# Patient Record
Sex: MALE | Race: WHITE | Employment: FULL TIME | URBAN - METROPOLITAN AREA
[De-identification: names, ages, dates, MRNs, and addresses within clinical notes are randomized per-mention and may not be internally consistent; named-entity substitution may affect disease eponyms.]

---

## 2022-03-01 ENCOUNTER — OFFICE VISIT (OUTPATIENT)
Dept: PRIMARY CARE CLINIC | Age: 27
End: 2022-03-01
Payer: COMMERCIAL

## 2022-03-01 ENCOUNTER — TELEPHONE (OUTPATIENT)
Dept: PRIMARY CARE CLINIC | Age: 27
End: 2022-03-01

## 2022-03-01 VITALS
HEIGHT: 71 IN | OXYGEN SATURATION: 97 % | BODY MASS INDEX: 44.1 KG/M2 | HEART RATE: 99 BPM | RESPIRATION RATE: 18 BRPM | TEMPERATURE: 97.7 F | DIASTOLIC BLOOD PRESSURE: 91 MMHG | SYSTOLIC BLOOD PRESSURE: 139 MMHG | WEIGHT: 315 LBS

## 2022-03-01 DIAGNOSIS — Z11.59 ENCOUNTER FOR HEPATITIS C SCREENING TEST FOR LOW RISK PATIENT: ICD-10-CM

## 2022-03-01 DIAGNOSIS — Z23 ENCOUNTER FOR IMMUNIZATION: ICD-10-CM

## 2022-03-01 DIAGNOSIS — E29.1 HYPOGONADISM IN MALE: Primary | ICD-10-CM

## 2022-03-01 DIAGNOSIS — R03.0 WHITE COAT SYNDROME WITH HIGH BLOOD PRESSURE WITHOUT HYPERTENSION: ICD-10-CM

## 2022-03-01 DIAGNOSIS — Z79.899 MEDICATION MANAGEMENT: ICD-10-CM

## 2022-03-01 DIAGNOSIS — Z76.89 ENCOUNTER TO ESTABLISH CARE: ICD-10-CM

## 2022-03-01 DIAGNOSIS — G43.709 CHRONIC MIGRAINE WITHOUT AURA WITHOUT STATUS MIGRAINOSUS, NOT INTRACTABLE: ICD-10-CM

## 2022-03-01 DIAGNOSIS — R73.03 PREDIABETES: ICD-10-CM

## 2022-03-01 DIAGNOSIS — Z00.00 ANNUAL PHYSICAL EXAM: ICD-10-CM

## 2022-03-01 DIAGNOSIS — L65.9 ALOPECIA: ICD-10-CM

## 2022-03-01 DIAGNOSIS — E66.01 MORBID OBESITY WITH BMI OF 60.0-69.9, ADULT (HCC): ICD-10-CM

## 2022-03-01 PROCEDURE — 99204 OFFICE O/P NEW MOD 45 MIN: CPT | Performed by: NURSE PRACTITIONER

## 2022-03-01 PROCEDURE — 90471 IMMUNIZATION ADMIN: CPT | Performed by: NURSE PRACTITIONER

## 2022-03-01 PROCEDURE — 90686 IIV4 VACC NO PRSV 0.5 ML IM: CPT | Performed by: NURSE PRACTITIONER

## 2022-03-01 RX ORDER — RIMEGEPANT SULFATE 75 MG/75MG
TABLET, ORALLY DISINTEGRATING ORAL
COMMUNITY
Start: 2022-02-15

## 2022-03-01 RX ORDER — TESTOSTERONE CYPIONATE 200 MG/ML
1 INJECTION INTRAMUSCULAR
COMMUNITY
End: 2022-03-01 | Stop reason: SDUPTHER

## 2022-03-01 RX ORDER — METFORMIN HYDROCHLORIDE 1000 MG/1
1000 TABLET, FILM COATED, EXTENDED RELEASE ORAL DAILY
COMMUNITY
End: 2022-03-01 | Stop reason: SDUPTHER

## 2022-03-01 RX ORDER — PROPRANOLOL HYDROCHLORIDE 10 MG/1
10 TABLET ORAL 2 TIMES DAILY
Qty: 20 TABLET | Refills: 1 | Status: SHIPPED | OUTPATIENT
Start: 2022-03-01 | End: 2022-07-18 | Stop reason: ALTCHOICE

## 2022-03-01 RX ORDER — METFORMIN HYDROCHLORIDE 1000 MG/1
1000 TABLET, FILM COATED, EXTENDED RELEASE ORAL DAILY
Qty: 30 TABLET | Refills: 1 | Status: SHIPPED | OUTPATIENT
Start: 2022-03-01 | End: 2022-03-04 | Stop reason: ALTCHOICE

## 2022-03-01 RX ORDER — FINASTERIDE 1 MG/1
1 TABLET, FILM COATED ORAL DAILY
Qty: 30 TABLET | Refills: 0 | Status: SHIPPED | OUTPATIENT
Start: 2022-03-01 | End: 2022-03-28 | Stop reason: SDUPTHER

## 2022-03-01 RX ORDER — FINASTERIDE 1 MG/1
5 TABLET, FILM COATED ORAL DAILY
COMMUNITY
End: 2022-03-01 | Stop reason: SDUPTHER

## 2022-03-01 RX ORDER — ANASTROZOLE 1 MG/1
0.5 TABLET ORAL
COMMUNITY
Start: 2022-02-16 | End: 2022-03-01 | Stop reason: SDUPTHER

## 2022-03-01 RX ORDER — TESTOSTERONE CYPIONATE 200 MG/ML
200 INJECTION INTRAMUSCULAR
Qty: 4 ML | Refills: 0 | Status: SHIPPED | OUTPATIENT
Start: 2022-03-04 | End: 2022-03-28 | Stop reason: SDUPTHER

## 2022-03-01 RX ORDER — ANASTROZOLE 1 MG/1
0.5 TABLET ORAL
Qty: 16 TABLET | Refills: 0 | Status: SHIPPED | OUTPATIENT
Start: 2022-03-01 | End: 2022-03-28 | Stop reason: SDUPTHER

## 2022-03-01 NOTE — PROGRESS NOTES
Chief Complaint   Patient presents with   [address]    Weight Management       Health Maintenance Due   Topic    Hepatitis C Screening     Depression Screen     DTaP/Tdap/Td series (1 - Tdap)    Flu Vaccine (1)    COVID-19 Vaccine (3 - Booster for [employer] series)        1. Have you been to the ER, urgent care clinic since your last visit? Hospitalized since your last visit? No    2. Have you seen or consulted any other health care providers outside of the [address] [place] [address] since your last visit? Include any pap smears or colon screening.  No    Visit Vitals  BP (!) 167/103 (BP 1 Location: Left lower arm, BP Patient Position: Sitting, BP Cuff Size: Adult long)   Pulse 99   Temp 97.7 °F (36.5 °C) (Temporal)   Resp 18   Ht 5' 11\" (1.803 m)   SpO2 97%

## 2022-03-01 NOTE — LETTER
3/1/2022 11:18 AM    Mr. Tommy Washington  [address] [id number]    CONTROLLED SUBSTANCE MEDICATION AGREEMENT     Patient Name: Tommy Washington  Patient YOB: 1995     I understand, that controlled substance medications may be used to help better manage my symptoms and to improve my ability to function at home, work and in social settings. However, I also understand that these medications do have risks, which have been discussed with me, including possible development of physical or psychological dependence. I understand that successful treatment requires mutual trust and honesty between me and my provider. I understand and agree that following this Medication Agreement is necessary in continuing my provider-patient relationship and the success of my treatment plan. Explanation from my Provider: Benefits and Goals of Controlled Substance Medications: There are two potential goals for your treatment: (1) decreased pain and suffering (2) improved daily life functions. There are many possible treatments for your chronic condition(s). Alternatives such as physical therapy, yoga, massage, home daily exercise, meditation, relaxation techniques, injections, chiropractic manipulations, surgery, cognitive therapy, hypnosis and many medications that are not habit-forming may be used. Use of controlled substance medications may be helpful, but they are unlikely to resolve all symptoms or restore all function. Explanation from my Provider: Risks of Controlled Substance Medications:  Opioid pain medications: These medications can lead to problems such as addiction/dependence, sedation, lightheadedness/dizziness, memory issues, falls, constipation, nausea, or vomiting. They may also impair the ability to drive or operate machinery. Additionally, these medications may lower testosterone levels, leading to loss of bone strength, stamina and sex drive.   They may cause problems with breathing, sleep apnea and reduced coughing, which is especially dangerous for patients with lung disease. Overdose or dangerous interactions with alcohol and other medications may occur, leading to death. Hyperalgesia may develop, which means patients receiving opioids for the treatment of pain may become more sensitive to certain painful stimuli, and in some cases, experience pain from ordinarily non-painful stimuli. Women between the ages of 14-53 who could become pregnant should carefully weigh the risks and benefits of opioids with their physicians, as these medications increase the risk of pregnancy complications, including miscarriage,  delivery and stillbirth. It is also possible for babies to be born addicted to opioids. Opioid dependence withdrawal symptoms may include; feelings of uneasiness, increased pain, irritability, belly pain, diarrhea, sweats and goose-flesh. Benzodiazepines and non-benzodiazepine sleep medications: These medications can lead to problems such as addiction/dependence, sedation, fatigue, lightheadedness, dizziness, incoordination, falls, depression, hallucinations, and impaired judgment, memory and concentration. The ability to drive and operate machinery may also be affected. Abnormal sleep-related behaviors have been reported, including sleepwalking, driving, making telephone calls, eating, or having sex while not fully awake. These medications can suppress breathing and worsen sleep apnea, particularly when combined with alcohol or other sedating medications, potentially leading to death. Dependence withdrawal symptoms may include tremors, anxiety, hallucinations and seizures. Initials:_______  Stimulants:  Common adverse effects include addiction/dependence, increased blood  pressure and heart rate, decreased appetite, nausea, involuntary weight loss, insomnia,  irritability, and headaches.   These risks may increase when these medications are combined with other stimulants, such as caffeine pills or energy drinks, certain weight loss supplements and oral decongestants. Dependence withdrawal symptoms may include depressed mood, loss of interest, suicidal thoughts, anxiety, fatigue, appetite changes and agitation. Testosterone replacement therapy:  Potential side effects include increased risk of stroke and heart attack, blood clots, increased blood pressure, increased cholesterol, enlarged prostate, sleep apnea, irritability/aggression and other mood disorders, and decreased fertility. I agree and understand that I and my prescriber have the following rights and responsibilities regarding my treatment plan:     1. MY RIGHTS:  To be informed of my treatment and medication plan. To be an active participant in my health and wellbeing. 2. MY RESPONSIBILITY AND UNDERSTANDING FOR USE OF MEDICATIONS   I will take medications at the dose and frequency as directed. For my safety, I will not increase or change how I take my medications without the recommendation of my healthcare provider.  I will actively participate in any program recommended by my provider which may improve function, including social, physical, psychological programs.  I will not take my medications with alcohol or other drugs not prescribed to me. I understand that drinking alcohol with my medications increases the chances of side effects, including reduced breathing rate and could lead to personal injury when operating machinery.  I understand that if I have a history of substance use disorders, including alcohol or other illicit drugs, that I may be at increased risk of addiction to my medications.  I agree to notify my provider immediately if I should become pregnant so that my treatment plan can be adjusted.    I agree and understand that I shall only receive controlled substance medications from the prescriber that signed this agreement unless there is written agreement among other prescribers of controlled substances outlining the responsibility of the medications being prescribed.  I understand that the if the controlled medication is not helping to achieve goals, the dosage may be tapered and no longer prescribed. 3. MY RESPONSIBILITY FOR COMMUNICATION / PRESCRIPTION RENEWALS   I agree that all controlled substance medications that I take will be prescribed only by my provider. If another healthcare provider prescribes me medication in an emergency, I will notify my provider within seventy-two (72) hours.  I will arrange for refills at the prescribed interval ONLY during regular office hours. I will not ask for refills earlier than agreed, after-hours, on holidays or weekends. Refills may take up to 72 hours for processing and prescriptions to reach the pharmacy.  I will inform my other health care providers that I am taking these medications and of the existence of this [id number]. In the event of an emergency, I will provide the same information to the emergency department prescribers. Initials:_______  [employer] I will keep my provider updated on the pharmacy I am using for controlled medication prescription filling. 4. MY RESPONSIBILITY FOR PROTECTING MEDICATIONS   I will protect my prescriptions and medications. I understand that lost or misplaced prescriptions will not be replaced.  I will keep medications only for my own use and will not share them with others. I will keep all medications away from children.  I agree that if my medications are adjusted or discontinued, I will properly dispose of any remaining medications. I understand that I will be required to dispose of any remaining controlled medications as, directed by my prescriber, prior to being provided with any prescriptions for other controlled medications.   Medication drop box locations can be found at: [email or web address]    5. MY RESPONSIBILITY WITH ILLEGAL DRUGS    I will not use illegal or street drugs or another person's prescription medications not prescribed to me.  If there are identified addiction type symptoms, then referral to a program may be provided by my provider and I agree to follow through with this recommendation. 6. MY RESPONSIBILITY FOR COOPERATION WITH INVESTIGATIONS   I understand that my provider will comply with any applicable law and may discuss my use and/or possible misuse/abuse of controlled substances and alcohol, as appropriate, with any health care provider involved in my care, pharmacist, or legal authority.  I authorize my provider and pharmacy to cooperate fully with law enforcement agencies (as permitted by law) in the investigation of any possible misuse, sale, or other diversion of my controlled substances.  I agree to waive any applicable privilege or right of privacy or confidentiality with respect to these authorizations. 7. PROVIDERS RIGHT TO MONITOR FOR SAFETY: PRESCRIPTION MONITORING / DRUG TESTING   I consent to drug/toxicology screening and will submit to a drug screen upon my providers request to assure I am only taking the prescribed drugs for my safety monitoring. I understand that a drug screen is a laboratory test in which a sample of my urine, blood or saliva is checked to see what drugs I have been taking. This may entail an observed urine specimen, which means that a nurse or other health care provider may watch me provide urine, and I will cooperate if I am asked to provide an observed specimen.  I understand that my provider will check a copy of my State Prescription Monitoring Program () Report in order to safely prescribe medications.  Pill Counts: I consent to pill counts when requested.   I may be asked to bring all my prescribed controlled substance medications, in their original bottles, to all of my scheduled appointments. In addition, my provider may ask me to come to the practice at any time for a random pill count. Initials:_______    8. TERMINATION OF THIS AGREEMENT  For my safety, my prescriber has the right to stop prescribing controlled substance medications and may end this agreement.  Conditions that may result in termination of this agreement:  a. I do not show any improvement in pain, or my activity has not improved. b. I develop rapid tolerance or loss of improvement, as described in my treatment plan.  c. I develop significant side effects from the medication. d. My behavior is not consistent with the responsibilities outlined above, thereby causing safety concerns to continue prescribing controlled substance medications. e. I fail to follow the terms of this agreement. f. Other:____________________________       UNDERSTANDING THIS MEDICATION AGREEMENT:    I have read the above and have had all my questions answered. For chronic disease management, I know that my symptoms can be managed with many types of treatments. A chronic medication trial may be part of my treatment, but I must be an active participant in my care. Medication therapy is only one part of my symptom management plan. In some cases, there may be limited scientific evidence to support the chronic use of certain medications to improve symptoms and daily function. Furthermore, in certain circumstances, there may be scientific information that suggests that the use of chronic controlled substances may worsen my symptoms and increase my risk of unintentional death directly related to this medication therapy. I know that if my provider feels my risk from controlled medications is greater than my benefit, I will have my controlled substance medication(s) compassionately lowered or removed altogether.      I further agree to allow this office to contact my HIPAA contact if there are concerns about my safety and use of the controlled medications. I have agreed to use the prescribed controlled substance medications to me as instructed by my provider and as stated in this Medication Agreement. My initial on each page and my signature below shows that I have read each page and I have had the opportunity to ask questions with answers provided by my provider.     Patient Name (Printed): _____________________________________  Patient Signature:  ______________________   Date: _____________    Prescriber Name (Printed): ___________________________________  Prescriber Signature: _____________________  Date: _____________               Shakeel Spring NP

## 2022-03-01 NOTE — PROGRESS NOTES
[facility]   [address], [address] [facility]  P: [phone]  F: [phone]    SUBJECTIVE     HPI:     Kishor Benjamin is a 33 y.o. male who is seen in the clinic for   Chief Complaint   Patient presents with   [facility]      Patient moved from [place] [address] to [place] in March, 2021. He has struggled to make an appointment with a PCP and has had to \"cut dose in half\" of all prescribed medications. Dr. Ballard Failing in [place] [address]. Requested that patient contact clinic to get medications sent via fax. During the appointment, the patient inquired about potential weight management options that he could pursue. Previously, he stated that providers encouraged Bariatric Surgery. However, the patient has performed his own research and would only like to get surgery if it's his \"last resort\". He has tried Wellbutrin to curb his appetite in the past and it had minimal affect. The patient states that his weight has been [employer]" since the dx of Hypogonadism. However, he still struggles with his appetite and exercise. Inquiring about GLP-1 medications at this time. PDMP has been reviewed. He does not have a record of Testosterone being prescribed. However, he did hand me a Ziploc bag that had a prescription from Dr. Yecenia Saenz. Additionally, the patient has not performed a UDS recently. Advised patient that a 30-day refill can be ordered at this time. However, a UDS along with other lab testing must be performed before getting next refill. Controlled Substance Agreement has been signed by patient and placed in Media. Diet: Poor  Exercise: Limited  Sleep Hygiene:7-8 hours per night. Self-testicular exam: Not performing. Advised to perform once monthly. Immunizations: Up to date other than Flu vaccine. There are no problems to display for this patient.        Past Medical History:   Diagnosis Date    Headache     Hypogonadism in male     Sleep apnea      Past Surgical History:   Procedure Laterality Date    HX TYMPANOSTOMY       Social History     Socioeconomic History    Marital status:      Spouse name: Not on file    Number of children: Not on file    Years of education: Not on file    Highest education level: Not on file   Occupational History    Not on file   Tobacco Use    Smoking status: Never Smoker    Smokeless tobacco: Never Used   Vaping Use    Vaping Use: Never used   Substance and Sexual Activity    Alcohol use: Not Currently     Comment: occ    Drug use: Never    Sexual activity: Yes     Partners: Female   Other Topics Concern    Not on file   Social History Narrative    Not on file     Social Determinants of Health     Financial Resource Strain:     Difficulty of Paying Living Expenses: Not on file   Food Insecurity:     Worried About Running Out of Food in the Last Year: Not on file    [place] of Food in the Last Year: Not on file   Transportation Needs:     Lack of Transportation (Medical): Not on file    Lack of Transportation (Non-Medical):  Not on file   Physical Activity:     Days of Exercise per Week: Not on file    Minutes of Exercise per Session: Not on file   Stress:     Feeling of Stress : Not on file   Social Connections:     Frequency of Communication with Friends and Family: Not on file    Frequency of Social Gatherings with Friends and Family: Not on file    Attends Zoroastrianism Services: Not on file    Active Member of [address] [place] [address] [employer] or Organizations: Not on file    Attends Club or Organization Meetings: Not on file    Marital Status: Not on file   Intimate Partner Violence:     Fear of Current or Ex-Partner: Not on file    Emotionally Abused: Not on file    Physically Abused: Not on file    Sexually Abused: Not on file   Housing Stability:     Unable to Pay for Housing in the Last Year: Not on file    Number of [place] in the Last Year: Not on file    Unstable Housing in the Last Year: Not on file     Family History   Problem Relation Age of Onset    Immune Disorder Mother     COPD Mother     Alcohol abuse Father     Drug Abuse Brother      Immunization History   Administered Date(s) Administered    COVID-19, Pfizer Purple top, DILUTE for use, 12+ yrs, 30mcg/0.3mL dose 01/18/2021, 03/31/2021, 04/21/2021, 01/18/2022    Influenza Vaccine (Quad) PF (>6 Mo Flulaval, Fluarix, and >3 Yrs Afluria, Fluzone 40196) 03/01/2022      No Known Allergies    No results found for any previous visit. No image results found. Current Outpatient Medications   Medication Sig Dispense Refill    Nurtec ODT 75 mg disintegrating tablet TAKE ONE TABLET BY MOUTH EVERY OTHER DAY. DO NOT EXCEED 1 TABLET IN 24 HOURS.  anastrozole (ARIMIDEX) 1 mg tablet Take 0.5 mg by mouth every Tuesday and Friday. 16 Tablet 0    finasteride (PROPECIA) 1 mg tablet Take 1 Tablet by mouth daily. 30 Tablet 0    metFORMIN (GLUMETZA) 1,000 mg TG24 24 hour tablet Take 1,000 mg by mouth daily. 30 Tablet 1    [START ON 3/4/2022] testosterone cypionate (DEPOTESTOTERONE CYPIONATE) 200 mg/mL injection 1 mL by IntraMUSCular route Every Friday. Max Daily Amount: 200 mg. Indications: abnormal function and activity of the testis 4 mL 0    propranoloL (INDERAL) 10 mg tablet Take 1 Tablet by mouth two (2) times a day. 20 Tablet 1           The past medical history, past surgical history, and family history were reviewed and updated in the medical record. Lab values/Imaging were reviewed. The medications were reviewed and updated in the medical record. Immunizations were reviewed and updated in the medical record. All relevant preventative screenings reviewed and updated in the medical record. REVIEW OF SYSTEMS   Review of Systems   Constitutional: Negative for malaise/fatigue and weight loss. HENT: Negative for congestion, ear pain and sore throat. Eyes: Negative for blurred vision, double vision and pain.    Respiratory: Negative for cough, shortness of breath and wheezing. Cardiovascular: Negative for chest pain, palpitations, orthopnea and leg swelling. Gastrointestinal: Negative for abdominal pain, constipation, diarrhea, heartburn, nausea and vomiting. Genitourinary: Negative for frequency and urgency. Musculoskeletal: Negative for back pain, joint pain and myalgias. Neurological: Negative for dizziness, weakness and headaches. Psychiatric/Behavioral: Negative for depression and suicidal ideas. The patient is not nervous/anxious and does not have insomnia. PHYSICAL EXAM   BP (!) 164/97 (BP 1 Location: Left lower arm, BP Patient Position: Sitting, BP Cuff Size: Adult long)   Pulse 99   Temp 97.7 °F (36.5 °C) (Temporal)   Resp 18   Ht 5' 11\" (1.803 m)   Wt (!) 441 lb 12.8 oz (200.4 kg)   SpO2 97%   BMI 61.62 kg/m²      Physical Exam  Vitals and nursing note reviewed. Constitutional:       General: He is not in acute distress. Appearance: Normal appearance. He is well-developed. He is obese. He is not diaphoretic. HENT:      Head: Normocephalic and atraumatic. Right Ear: Tympanic membrane, ear canal and external ear normal.      Left Ear: Tympanic membrane, ear canal and external ear normal.      Mouth/Throat:      Mouth: Mucous membranes are moist.      Pharynx: Oropharynx is clear. Eyes:      General: No scleral icterus. Right eye: No discharge. Left eye: No discharge. Extraocular Movements: Extraocular movements intact. Conjunctiva/sclera: Conjunctivae normal.      Pupils: Pupils are equal, round, and reactive to light. Neck:      Thyroid: No thyromegaly. Cardiovascular:      Rate and Rhythm: Normal rate and regular rhythm. Pulses: Normal pulses. Dorsalis pedis pulses are 2+ on the right side and 2+ on the left side. Pulmonary:      Effort: Pulmonary effort is normal.      Breath sounds: Normal breath sounds. No wheezing.    Abdominal: General: Bowel sounds are normal. There is no distension. Palpations: Abdomen is soft. Tenderness: There is no abdominal tenderness. Musculoskeletal:         General: Normal range of motion. Cervical back: Normal range of motion and neck supple. Right lower leg: No edema. Left lower leg: No edema. Comments: B/L knees without crepitus   Lymphadenopathy:      Cervical: No cervical adenopathy. Skin:     General: Skin is warm and dry. Capillary Refill: Capillary refill takes less than 2 seconds. Neurological:      General: No focal deficit present. Mental Status: He is alert and oriented to person, place, and time. Cranial Nerves: No cranial nerve deficit. Deep Tendon Reflexes:      Reflex Scores:       Patellar reflexes are 2+ on the right side and 2+ on the left side. Psychiatric:         Mood and Affect: Mood normal.         Behavior: Behavior normal.           exam deferred. ASSESSMENT/ PLAN   Below is the assessment and plan developed based on review of pertinent history, physical exam, labs, studies, and medications. 1. Hypogonadism in male  Discussed with patient that pending results of labs, a referral to an Endocrinologist may need to be made. They can better manage tailor his current treatment regimen to ensure that the patient does not experience any s/e of the medications. If his levels are WNL, then I can continue to prescribe maintenance dosing. Educated on risks/benefits of taking medications in addition to its side effects.   -     TESTOSTERONE, FREE+TOTAL; Future  -     ESTROGENS, FRACTIONATED; Future  -     anastrozole (ARIMIDEX) 1 mg tablet; Take 0.5 mg by mouth every Tuesday and Friday., Normal, Disp-16 Tablet, R-0  -     testosterone cypionate (DEPOTESTOTERONE CYPIONATE) 200 mg/mL injection; 1 mL by IntraMUSCular route Every Friday. Max Daily Amount: 200 mg.  Indications: abnormal function and activity of the testis, Normal, Disp-4 mL, R-0  -     SEX HORMONE BINDING GLOBULIN; Future  2. White coat syndrome with high blood pressure without hypertension  Per patient, he gets very anxious while seeing providers in the past. He currently does not have a BP cuff at home. Advised him to take BP periodically while at home and to report readings to me at next visit. If still elevated, he verbalized understanding that ACE/ARB will be prescribed. -     propranoloL (INDERAL) 10 mg tablet; Take 1 Tablet by mouth two (2) times a day., Normal, Disp-20 Tablet, R-1  3. Chronic migraine without aura without status migrainosus, not intractable  Per patient, he was seen by ENT in the past to r/o sinus migraines. They assured him that his Migraines are not r/t sinus pressure. Currently being prescribed Nurtec. 4. Morbid obesity with BMI of 60.0-69.9, adult ([facility] [address]  Complicates all aspects of care. Discussed the importance of establishing care with a RD. They can tailor a diet specific for the patient to help with weight loss. Also, advised patient to make an appointment in about a month to further discuss weight loss management options.   -     LIPID PANEL; Future  -     TSH 3RD GENERATION; Future  -     REFERRAL TO NUTRITION  5. Prediabetes  -     metFORMIN (GLUMETZA) 1,000 mg TG24 24 hour tablet; Take 1,000 mg by mouth daily. , Normal, Disp-30 Tablet, R-1  -     REFERRAL TO NUTRITION  6. Alopecia  -     finasteride (PROPECIA) 1 mg tablet; Take 1 Tablet by mouth daily. , Normal, Disp-30 Tablet, R-0  7. Medication management  -     DRUG SCREEN, URINE; Future  8. Annual physical exam  ROS/PE were unremarkable other than weight.   -     LIPID PANEL; Future  -     TSH 3RD GENERATION; Future  -     HEMOGLOBIN A1C WITH EAG; Future  -     METABOLIC PANEL, COMPREHENSIVE; Future  -     CBC W/O DIFF; Future  9. Encounter for immunization  -     INFLUENZA VIRUS VAC QUAD,SPLIT,PRESV FREE SYRINGE IM  10. Encounter to establish care  11.  Encounter for hepatitis C screening test for low risk patient  -     HEPATITIS C AB; Future         Follow-up and Dispositions    · Return in about 4 weeks (around 3/29/2022) for Discuss Saxenda for weight loss . Disclaimer:  Advised patient to call back or return to office if symptoms worsen/change/persist.  Discussed expected course/resolution/complications of diagnosis in detail with patient. Medication risks/benefits/alternatives discussed with patient. Patient was given an after visit summary which includes diagnoses, current medications, & vitals. Discussed patient instructions and advised to read to all patient instructions regarding care. Patient expressed understanding with the diagnosis and plan.        Sheila Camargo NP  3/1/2022

## 2022-03-01 NOTE — TELEPHONE ENCOUNTER
Unable to leave voice mail due to mail box being full. Was calling to information him that when he goes to get his labs done he will need to [facility] urine for a drug screen.

## 2022-03-02 ENCOUNTER — TELEPHONE (OUTPATIENT)
Dept: PRIMARY CARE CLINIC | Age: 27
End: 2022-03-02

## 2022-03-02 NOTE — TELEPHONE ENCOUNTER
Per call from [facility], below med need directions verified      finasteride (PROPECIA) 1 mg tablet         Call [phone]

## 2022-03-02 NOTE — TELEPHONE ENCOUNTER
Spoke to pt aware that a urine drug test order is in and when he gets labs done he can do the drug screen.

## 2022-03-04 ENCOUNTER — TELEPHONE (OUTPATIENT)
Dept: PRIMARY CARE CLINIC | Age: 27
End: 2022-03-04

## 2022-03-04 RX ORDER — METFORMIN HYDROCHLORIDE 500 MG/1
500 TABLET ORAL 2 TIMES DAILY WITH MEALS
Qty: 30 TABLET | Refills: 0 | Status: SHIPPED | OUTPATIENT
Start: 2022-03-04 | End: 2022-03-08 | Stop reason: ALTCHOICE

## 2022-03-04 NOTE — TELEPHONE ENCOUNTER
Nelda gay [place] is checking on prior authorization for Metformin.       Her number is [phone]

## 2022-03-07 ENCOUNTER — TELEPHONE (OUTPATIENT)
Dept: PRIMARY CARE CLINIC | Age: 27
End: 2022-03-07

## 2022-03-07 NOTE — TELEPHONE ENCOUNTER
[employer] is on the phone, they need clarification on the Metformin prescription. They can be reached at [phone].

## 2022-03-08 RX ORDER — METFORMIN HYDROCHLORIDE 750 MG/1
750 TABLET, EXTENDED RELEASE ORAL DAILY
Qty: 30 TABLET | Refills: 0 | Status: SHIPPED | OUTPATIENT
Start: 2022-03-08 | End: 2022-03-28

## 2022-03-13 DIAGNOSIS — E29.1 HYPOGONADISM IN MALE: Primary | ICD-10-CM

## 2022-03-14 ENCOUNTER — TELEPHONE (OUTPATIENT)
Dept: PRIMARY CARE CLINIC | Age: 27
End: 2022-03-14

## 2022-03-14 NOTE — TELEPHONE ENCOUNTER
----- Message from Jesus Lynch NP sent at 3/13/2022  8:43 AM EDT -----  Your Sex Hormone Binding Globulin is low. I am going to refer you to Endocrinology for management. All other labs are unremarkable.

## 2022-03-18 ENCOUNTER — PATIENT MESSAGE (OUTPATIENT)
Dept: PRIMARY CARE CLINIC | Age: 27
End: 2022-03-18

## 2022-03-18 ENCOUNTER — TELEPHONE (OUTPATIENT)
Dept: PRIMARY CARE CLINIC | Age: 27
End: 2022-03-18

## 2022-03-18 NOTE — TELEPHONE ENCOUNTER
----- Message from Jesse Delatorre NP sent at 3/18/2022  3:56 PM EDT -----  Your Testosterone levels are normal. Were you able to see an Endocrinologist soon?

## 2022-03-21 ENCOUNTER — TELEPHONE (OUTPATIENT)
Dept: PRIMARY CARE CLINIC | Age: 27
End: 2022-03-21

## 2022-03-21 NOTE — TELEPHONE ENCOUNTER
----- Message from Sheila Camargo NP sent at 3/18/2022  3:56 PM EDT -----  Your Testosterone levels are normal. Were you able to see an Endocrinologist soon? Labs okay other than you appear more dehydrated - increase fluid intake and I will repeat labs for kidney function in the future.

## 2022-03-21 NOTE — TELEPHONE ENCOUNTER
Unable to leave voicemail due to mail box being full.  Provider would like for pt see Endo before July

## 2022-03-23 ENCOUNTER — TELEPHONE (OUTPATIENT)
Dept: PRIMARY CARE CLINIC | Age: 27
End: 2022-03-23

## 2022-03-23 NOTE — TELEPHONE ENCOUNTER
----- Message from Jill Rapp sent at 3/22/2022  6:11 PM EDT -----  Subject: Message to Provider    QUESTIONS  Information for Provider? Patient returning a call for Rhonda Ware, Patient had   some questions earlier in the week so he believes Rhonda Ware may have been   calling back with those answers but he is unsure. Please call patient back   when possible.  ---------------------------------------------------------------------------  --------------  CALL BACK INFO  What is the best way for the office to contact you? OK to leave message on   voicemail  Preferred Call Back Phone Number? [phone]  ---------------------------------------------------------------------------  --------------  SCRIPT ANSWERS  Relationship to Patient?  Self

## 2022-03-23 NOTE — TELEPHONE ENCOUNTER
Spoke to pt, pt is aware that July  is too late to see Endo. This writer will call Endo offices to see if they have any open slots.

## 2022-03-27 DIAGNOSIS — E29.1 MALE HYPOGONADISM: Primary | ICD-10-CM

## 2022-03-28 ENCOUNTER — OFFICE VISIT (OUTPATIENT)
Dept: PRIMARY CARE CLINIC | Age: 27
End: 2022-03-28
Payer: COMMERCIAL

## 2022-03-28 VITALS
WEIGHT: 315 LBS | HEART RATE: 95 BPM | DIASTOLIC BLOOD PRESSURE: 91 MMHG | SYSTOLIC BLOOD PRESSURE: 147 MMHG | BODY MASS INDEX: 44.1 KG/M2 | HEIGHT: 71 IN | OXYGEN SATURATION: 99 % | RESPIRATION RATE: 18 BRPM | TEMPERATURE: 97.5 F

## 2022-03-28 DIAGNOSIS — E66.01 CLASS 3 SEVERE OBESITY DUE TO EXCESS CALORIES WITH SERIOUS COMORBIDITY AND BODY MASS INDEX (BMI) OF 60.0 TO 69.9 IN ADULT (HCC): ICD-10-CM

## 2022-03-28 DIAGNOSIS — E29.1 HYPOGONADISM MALE: Primary | ICD-10-CM

## 2022-03-28 DIAGNOSIS — I10 ESSENTIAL HYPERTENSION: ICD-10-CM

## 2022-03-28 DIAGNOSIS — L65.9 ALOPECIA: ICD-10-CM

## 2022-03-28 DIAGNOSIS — Z71.3 ENCOUNTER FOR WEIGHT LOSS COUNSELING: ICD-10-CM

## 2022-03-28 PROCEDURE — 99214 OFFICE O/P EST MOD 30 MIN: CPT | Performed by: NURSE PRACTITIONER

## 2022-03-28 RX ORDER — PEN NEEDLE, DIABETIC 31 GX3/16"
NEEDLE, DISPOSABLE MISCELLANEOUS
Qty: 50 PEN NEEDLE | Refills: 0 | Status: SHIPPED | OUTPATIENT
Start: 2022-03-28

## 2022-03-28 RX ORDER — CLONIDINE HYDROCHLORIDE 0.1 MG/1
0.1 TABLET ORAL
Status: COMPLETED | OUTPATIENT
Start: 2022-03-28 | End: 2022-03-28

## 2022-03-28 RX ORDER — ANASTROZOLE 1 MG/1
0.5 TABLET ORAL
Qty: 7 TABLET | Refills: 0 | Status: SHIPPED | OUTPATIENT
Start: 2022-03-28 | End: 2022-06-02

## 2022-03-28 RX ORDER — SEMAGLUTIDE 0.5 MG/.5ML
0.5 INJECTION, SOLUTION SUBCUTANEOUS
Qty: 4 EACH | Refills: 1 | Status: SHIPPED | OUTPATIENT
Start: 2022-03-28 | End: 2022-04-06

## 2022-03-28 RX ORDER — FINASTERIDE 1 MG/1
1 TABLET, FILM COATED ORAL DAILY
Qty: 30 TABLET | Refills: 0 | Status: SHIPPED | OUTPATIENT
Start: 2022-03-28

## 2022-03-28 RX ORDER — TESTOSTERONE CYPIONATE 200 MG/ML
200 INJECTION INTRAMUSCULAR
Qty: 4 ML | Refills: 0 | Status: SHIPPED | OUTPATIENT
Start: 2022-04-01 | End: 2022-05-18 | Stop reason: SDUPTHER

## 2022-03-28 RX ORDER — VALSARTAN 40 MG/1
40 TABLET ORAL DAILY
Qty: 30 TABLET | Refills: 1 | Status: SHIPPED | OUTPATIENT
Start: 2022-03-28 | End: 2022-07-13 | Stop reason: SDUPTHER

## 2022-03-28 RX ADMIN — CLONIDINE HYDROCHLORIDE 0.1 MG: 0.1 TABLET ORAL at 09:00

## 2022-03-28 NOTE — PROGRESS NOTES
Chief Complaint   Patient presents with    Weight Management       There are no preventive care reminders to display for this patient. 1. Have you been to the ER, urgent care clinic since your last visit? Hospitalized since your last visit? No    2. Have you seen or consulted any other health care providers outside of the [address] [place] [address] since your last visit? Include any pap smears or colon screening.  No    Visit Vitals  BP (!) 175/84 (BP 1 Location: Left lower arm, BP Patient Position: Sitting, BP Cuff Size: Adult long)   Pulse 99   Temp 97.5 °F (36.4 °C) (Temporal)   Resp 18   Ht 5' 11\" (1.803 m)   Wt (!) 448 lb 3.2 oz (203.3 kg)   SpO2 99%   BMI 62.51 kg/m²

## 2022-03-28 NOTE — LETTER
3/28/2022 10:29 AM    Mr. Yadira Lazaro  [address] [facility] [address]    Orders Only on 03/11/2022   Component Date Value Ref Range Status    AMPHETAMINES 03/11/2022 Negative  Negative   Final    BARBITURATES 03/11/2022 Negative  Negative   Final    BENZODIAZEPINES 03/11/2022 Negative  Negative   Final    COCAINE 03/11/2022 Negative  Negative   Final    METHADONE 03/11/2022 Negative  Negative   Final    OPIATES 03/11/2022 Negative  Negative   Final    PCP(PHENCYCLIDINE) 03/11/2022 Negative  Negative   Final    THC (TH-CANNABINOL) 03/11/2022 Negative  Negative   Final    Drug screen comment 03/11/2022 (NOTE)   Final    Sex Hormone Binding Globulin 03/11/2022 12.5* 16.5 - 55.9 nmol/L Final    Estrone, Serum 03/11/2022 137* 15 - 65 pg/mL Final    Estradiol 03/11/2022 75.4* 7.6 - 42.6 pg/mL Final    WBC 03/11/2022 7.7  4.1 - 11.1 K/uL Final    RBC 03/11/2022 5.12  4.10 - 5.70 M/uL Final    HGB 03/11/2022 15.2  12.1 - 17.0 g/dL Final    HCT 03/11/2022 46.9  36.6 - 50.3 % Final    MCV 03/11/2022 91.6  80.0 - 99.0 FL Final    MCH 03/11/2022 29.7  26.0 - 34.0 PG Final    MCHC 03/11/2022 32.4  30.0 - 36.5 g/dL Final    RDW 03/11/2022 12.8  11.5 - 14.5 % Final    PLATELET 78/53/2080 423  150 - 400 K/uL Final    MPV 03/11/2022 10.7  8.9 - 12.9 FL Final    NRBC 03/11/2022 0.0  0  WBC Final    ABSOLUTE NRBC 03/11/2022 0.00  0.00 - 0.01 K/uL Final    Sodium 03/11/2022 138  136 - 145 mmol/L Final    Potassium 03/11/2022 4.8  3.5 - 5.1 mmol/L Final    Chloride 03/11/2022 104  97 - 108 mmol/L Final    CO2 03/11/2022 30  21 - 32 mmol/L Final    Anion gap 03/11/2022 4* 5 - 15 mmol/L Final    Glucose 03/11/2022 100  65 - 100 mg/dL Final    BUN 03/11/2022 20  6 - 20 MG/DL Final    Creatinine 03/11/2022 0.95  0.70 - 1.30 MG/DL Final    BUN/Creatinine ratio 03/11/2022 21* 12 - 20   Final    GFR est AA 03/11/2022 >60  >60 ml/min/1.73m2 Final    GFR est non-AA 03/11/2022 >60  >60 ml/min/1.73m2 Final    Calcium 03/11/2022 9.4  8.5 - 10.1 MG/DL Final    Bilirubin, total 03/11/2022 0.6  0.2 - 1.0 MG/DL Final    ALT (SGPT) 03/11/2022 78  12 - 78 U/L Final    AST (SGOT) 03/11/2022 32  15 - 37 U/L Final    Alk. phosphatase 03/11/2022 67  45 - 117 U/L Final    Protein, total 03/11/2022 7.3  6.4 - 8.2 g/dL Final    Albumin 03/11/2022 4.2  3.5 - 5.0 g/dL Final    Globulin 03/11/2022 3.1  2.0 - 4.0 g/dL Final    A-G Ratio 03/11/2022 1.4  1.1 - 2.2   Final    Hemoglobin A1c 03/11/2022 5.4  4.0 - 5.6 % Final    Est. average glucose 03/11/2022 108  mg/dL Final    Hep C virus Ab Interp. 03/11/2022 NONREACTIVE  NONREACTIVE   Final    TSH 03/11/2022 1.29  0.36 - 3.74 uIU/mL Final    Cholesterol, total 03/11/2022 166  <200 MG/DL Final    Triglyceride 03/11/2022 122  <150 MG/DL Final    HDL Cholesterol 03/11/2022 38  MG/DL Final    LDL, calculated 03/11/2022 103.6* 0 - 100 MG/DL Final    VLDL, calculated 03/11/2022 24.4  MG/DL Final    CHOL/HDL Ratio 03/11/2022 4.4  0.0 - 5.0   Final    Testosterone, total, by LC/MS 03/11/2022 536.8  264.0 - 916.0 ng/dL Final    Free testosterone (Direct) 03/11/2022 18.6  9.3 - 26.5 pg/mL Final         Legend: Use this to help understand your labs. You may not have all of these ordered   · WBC- White blood cell count  · HGB- Hemoglobin, red blood cell count  · HCT- Hematocrit, red blood cell count  · PLT- Platelets   · Sodium, Potassium, Chloride, Magnesium- electrolytes   · Creatinine, GFR- kidney function   · AST, ALT, Alkaline phosphatase, bilirubin- liver and gallbladder  · Lipase- pancreas   · RPR- Syphilis test, STD  · HIV, Gonorrhea, Chlamydia, Trichomonas- STDs  · Candida- yeast infection  · Nuswab- vaginal infections   · Urinalysis- a quick test about your urine   · Hemoglobin A1C- diabetes test  · Glucose- blood sugar   · HDL- \"Good\" Cholesterol. Goal >=40  · LDL- \"Bad\" Cholesterol.  Goal <100  · Triglycerides- Goal <150   · Vit D- Vitamin D level   · TSH, FT3, FT4- thyroid tests   · HCV Ab- test for Hepatitis C             Sincerely,      Raul Coker NP

## 2022-03-28 NOTE — PROGRESS NOTES
[facility]   [address], [address] [facility]  P: [phone]  F: [phone]    SUBJECTIVE     HPI:     Randa Gonzáles is a 33 y.o. male who is seen in the clinic for   Chief Complaint   Patient presents with    Weight Management      Was previously seen on 03/01, for management of multiple co-morbidities. Several hormone labs were drawn. His SBHG was low and Serum Estrone/Estradiol were high. Testosterone levels were normal. Patient takes Testosterone, Arimidex, and Finasteride as prescribed. After labs came back, I referred patient to Endocrinology. However, they cannot see the patient until July. During previous visit, we discussed that if his BP is still high, we will place him on a BP med. At today's visit, his initial BP was 171/81. Ordered once time dose of Clonidine 0.1 mg. BP is now 147/91. Per patient, he feels much more relaxed. Denies any s/s of hypertensive Crisis, CVA, and MI. Today, patient would like to discuss treatment options to manager his Morbid Obesity. His insurance will not cover Saxenda. However, they will cover [employer]. Discussed with patient common side effect profile of medication. Initial weight is 203.3 kg  Initial BMI is 62.5 kg/m  Initial waist circumference is 56 cm      There are no problems to display for this patient.        Past Medical History:   Diagnosis Date    Headache     Hypogonadism in male     Sleep apnea      Past Surgical History:   Procedure Laterality Date    HX TYMPANOSTOMY       Social History     Socioeconomic History    Marital status:      Spouse name: Not on file    Number of children: Not on file    Years of education: Not on file    Highest education level: Not on file   Occupational History    Not on file   Tobacco Use    Smoking status: Never Smoker    Smokeless tobacco: Never Used   Vaping Use    Vaping Use: Never used   Substance and Sexual Activity    Alcohol use: Not Currently Comment: occ    Drug use: Never    Sexual activity: Yes     Partners: Female   Other Topics Concern    Not on file   Social History Narrative    Not on file     Social Determinants of Health     Financial Resource Strain:     Difficulty of Paying Living Expenses: Not on file   Food Insecurity:     Worried About Running Out of Food in the Last Year: Not on file    [place] of Food in the Last Year: Not on file   Transportation Needs:     Lack of Transportation (Medical): Not on file    Lack of Transportation (Non-Medical):  Not on file   Physical Activity:     Days of Exercise per Week: Not on file    Minutes of Exercise per Session: Not on file   Stress:     Feeling of Stress : Not on file   Social Connections:     Frequency of Communication with Friends and Family: Not on file    Frequency of Social Gatherings with Friends and Family: Not on file    Attends Druze Services: Not on file    Active Member of [facility] or Organizations: Not on file    Attends Club or Organization Meetings: Not on file    Marital Status: Not on file   Intimate Partner Violence:     Fear of Current or Ex-Partner: Not on file    Emotionally Abused: Not on file    Physically Abused: Not on file    Sexually Abused: Not on file   Housing Stability:     Unable to Pay for Housing in the Last Year: Not on file    Number of [place] in the Last Year: Not on file    Unstable Housing in the Last Year: Not on file     Family History   Problem Relation Age of Onset    Immune Disorder Mother     COPD Mother     Alcohol abuse Father     Drug Abuse Brother      Immunization History   Administered Date(s) Administered    COVID-19, Pfizer Purple top, DILUTE for use, 12+ yrs, 30mcg/0.3mL dose 01/18/2021, 03/31/2021, 04/21/2021, 01/18/2022    Influenza Vaccine (Quad) PF (>6 Mo Flulaval, Fluarix, and >3 Yrs [address] [place] [address], Fluzone [address]) 03/01/2022      No Known Allergies    Orders Only on 03/11/2022   Component Date Value Ref Range Status    AMPHETAMINES 03/11/2022 Negative  Negative   Final    BARBITURATES 03/11/2022 Negative  Negative   Final    BENZODIAZEPINES 03/11/2022 Negative  Negative   Final    COCAINE 03/11/2022 Negative  Negative   Final    METHADONE 03/11/2022 Negative  Negative   Final    OPIATES 03/11/2022 Negative  Negative   Final    PCP(PHENCYCLIDINE) 03/11/2022 Negative  Negative   Final    THC (TH-CANNABINOL) 03/11/2022 Negative  Negative   Final    Drug screen comment 03/11/2022 (NOTE)   Final    Comment: This test is a screen for drugs of abuse in a medical setting only   (i.e., they are unconfirmed results and as such must not be used for   non-medical purposes e.g., employment testing, legal testing). Due to   its inherent nature, false positive (FP) and false negative (FN)   results may be obtained. Therefore, if necessary for medical care,   recommend confirmation of positive findings by GC/MS. The cutoff   values (i.e., the level at which this screening test becomes positive   for a given drug group) are:    Amphetamine/Methamphetamine: 300 ng/mL  Barbiturates:                200 ng/mL  Benzodiazepines:             200 ng/mL  Cocaine:                     150 ng/mL  Methadone:                   300 ng/mL  Opiates:                     300 ng/mL   Phencyclidine, PCP:           25 ng/mL  Marijuana, THC:               50 ng/mL    This screening test can identify the presence of the following drugs   when above the cutoff value; see list posted on the intranet. It can   be viewed by claudia                           ecting in sequence the following from the [address] home   page: [employer]; [address], Resources; [facility], Physician Resources Q to Z; \"UDS (Urine Drug Screen   Automated) List of Detectable Drugs. \"     Or use web address:   [email or web address]  UDS%20List%20of%20Detectable%20Drugs. pdf      Sex Hormone Binding Globulin 03/11/2022 12.5* 16.5 - 55.9 nmol/L Final    Comment: (NOTE)  Performed At: [facility] [address] [id number]  Lizz Barrios MD MI:[id number]      Estrone, Serum 03/11/2022 137* 15 - 65 pg/mL Final    Comment: (NOTE)  **Effective April 4, 2022 Estrone, Serum methodology**   will be changing to Enzyme immunoassay (EIA). The   reference interval will be changing to:                                               Male Range                        Renzo Stage I         5 -  17                        Renzo Stage II       10 -  25                        Renzo Stage III      15 -  25                        Renzo Stage IV       15 -  45                        Renzo Stage V        20 -  45                               Adult           0 - 174                                            Female Range                        Renzo Stage I         4 -  29                        Renzo Stage II       10 -  33                        Renzo Stage III      15 -  43                        Renzo Stage IV       16 -  77                        Renzo Stage V        29 - 105                     Adult (Premenopausal)    27 - 231               Menstrual Cycle (1-10 days)    19 - 149               Menstrual Cycle                            (11-20 days)   32 - 176               Menstrual Cycle (21-30 days)   37 - 200                     Adult (Postmenopausal)    0 - 125  Performed At: [facility]  [employer] [address] [id number]  Lizz Barrios MD SN:[id number]      Estradiol 03/11/2022 75.4* 7.6 - 42.6 pg/mL Final    Comment: (NOTE)  Roche ECLIA methodology  Performed At: [facility]  [employer] [address] [id number]  Lizz Barrios MD OA:[id number]      WBC 03/11/2022 7.7  4.1 - 11.1 K/uL Final    RBC 03/11/2022 5.12  4.10 - 5.70 M/uL Final    HGB 03/11/2022 15.2  12.1 - 17.0 g/dL Final    HCT 03/11/2022 46.9  36.6 - 50.3 % Final    MCV 03/11/2022 91.6  80.0 - 99.0 FL Final    MCH 03/11/2022 29.7  26.0 - 34.0 PG Final    MCHC 03/11/2022 32.4  30.0 - 36.5 g/dL Final    RDW 03/11/2022 12.8  11.5 - 14.5 % Final    PLATELET 98/18/3824 520  150 - 400 K/uL Final    MPV 03/11/2022 10.7  8.9 - 12.9 FL Final    NRBC 03/11/2022 0.0  0  WBC Final    ABSOLUTE NRBC 03/11/2022 0.00  0.00 - 0.01 K/uL Final    Sodium 03/11/2022 138  136 - 145 mmol/L Final    Potassium 03/11/2022 4.8  3.5 - 5.1 mmol/L Final    Chloride 03/11/2022 104  97 - 108 mmol/L Final    CO2 03/11/2022 30  21 - 32 mmol/L Final    Anion gap 03/11/2022 4* 5 - 15 mmol/L Final    Glucose 03/11/2022 100  65 - 100 mg/dL Final    BUN 03/11/2022 20  6 - 20 MG/DL Final    Creatinine 03/11/2022 0.95  0.70 - 1.30 MG/DL Final    BUN/Creatinine ratio 03/11/2022 21* 12 - 20   Final    GFR est AA 03/11/2022 >60  >60 ml/min/1.73m2 Final    GFR est non-AA 03/11/2022 >60  >60 ml/min/1.73m2 Final    Comment: Estimated GFR is calculated using the IDMS-traceable Modification of Diet in  Renal Disease (MDRD) Study equation, reported for both  Americans  (GFRAA) and non- Americans (GFRNA), and normalized to 1.73m2 body  surface area. The physician must decide which value applies to the patient.  Calcium 03/11/2022 9.4  8.5 - 10.1 MG/DL Final    Bilirubin, total 03/11/2022 0.6  0.2 - 1.0 MG/DL Final    ALT (SGPT) 03/11/2022 78  12 - 78 U/L Final    AST (SGOT) 03/11/2022 32  15 - 37 U/L Final    Alk.  phosphatase 03/11/2022 67  45 - 117 U/L Final    Protein, total 03/11/2022 7.3  6.4 - 8.2 g/dL Final    Albumin 03/11/2022 4.2  3.5 - 5.0 g/dL Final    Globulin 03/11/2022 3.1  2.0 - 4.0 g/dL Final    A-G Ratio 03/11/2022 1.4  1.1 - 2.2   Final    Hemoglobin A1c 03/11/2022 5.4  4.0 - 5.6 % Final    Comment: NEW METHOD PLEASE NOTE NEW REFERENCE RANGE  (NOTE)  HbA1C Interpretive Ranges  <5.7              Normal  5.7 - 6.4         Consider Prediabetes  >6.5              Consider Diabetes      Est. average glucose 03/11/2022 108  mg/dL Final    Hep C virus Ab Interp. 03/11/2022 NONREACTIVE  NONREACTIVE   Final    Method used is Siemens Advia Centaur    TSH 03/11/2022 1.29  0.36 - 3.74 uIU/mL Final    Comment:   Due to TSH heterogeneity, both structurally and degree of glycosylation,  monoclonal antibodies used in the TSH assay may not accurately quantitate TSH. Therefore, this result should be correlated with clinical findings as well as  with other assessments of thyroid function, e.g., free T4, free T3.  Cholesterol, total 03/11/2022 166  <200 MG/DL Final    Triglyceride 03/11/2022 122  <150 MG/DL Final    Comment: Based on NCEP-ATP III:  Triglycerides <150 mg/dL  is considered normal, 150-199  mg/dL  borderline high,  200-499 mg/dL high and  greater than or equal to 500  mg/dL very high.  HDL Cholesterol 03/11/2022 38  MG/DL Final    Comment: Based on NCEP ATP III, HDL Cholesterol <40 mg/dL is considered low and >60  mg/dL is elevated.  LDL, calculated 03/11/2022 103.6* 0 - 100 MG/DL Final    Comment: Based on the NCEP-ATP: LDL-C concentrations are considered  optimal <100 mg/dL,  near optimal/above Normal 100-129 mg/dL Borderline High: 130-159, High: 160-189  mg/dL Very High: Greater than or equal to 190 mg/dL      VLDL, calculated 03/11/2022 24.4  MG/DL Final    CHOL/HDL Ratio 03/11/2022 4.4  0.0 - 5.0   Final    Testosterone, total, by LC/MS 03/11/2022 536.8  264.0 - 916.0 ng/dL Final    Comment: (NOTE)  This [facility] LC/MS-MS method is currently certified by the . [facility]  Hormone Standardization Program (HoSt). Adult male reference  interval is based on a population of healthy nonobese males  (BMI <30) between 23and 44years old. [address] [place] [address], [address] [place], [place] [address],[facility];[id number]. PMID: [id number]. This test was developed and its performance characteristics  determined by Richard Obrien. It has not been cleared or approved  by the Food and Drug Administration.   Performed At: [facility] [address] [id number]  Marko Mcnamara MD TJ:[id number]      Free testosterone (Direct) 03/11/2022 18.6  9.3 - 26.5 pg/mL Final    Comment: (NOTE)  Performed At: [facility] [address] [id number]  Marko Mcnamara MD TD:[id number]        No image results found. Current Outpatient Medications   Medication Sig Dispense Refill    anastrozole (ARIMIDEX) 1 mg tablet Take 0.5 mg by mouth every Monday, Wednesday, Friday. 7 Tablet 0    [START ON 4/1/2022] testosterone cypionate (DEPOTESTOTERONE CYPIONATE) 200 mg/mL injection 1 mL by IntraMUSCular route Every Friday. Max Daily Amount: 200 mg. Indications: abnormal function and activity of the testis 4 mL 0    finasteride (PROPECIA) 1 mg tablet Take 1 Tablet by mouth daily. 30 Tablet 0    semaglutide, weight loss, (Wegovy) 0.5 mg/0.5 mL pnij 0.5 mg by SubCUTAneous route every seven (7) days for 30 days. Indications: weight loss management for an obese person 4 Each 1    Insulin Needles, Disposable, (Jeanie Pen Needle) 32 gauge x 5/32\" ndle Please use one weekly with Wagovy injections. 50 Pen Needle 0    valsartan (DIOVAN) 40 mg tablet Take 1 Tablet by mouth daily. 30 Tablet 1    Nurtec ODT 75 mg disintegrating tablet TAKE ONE TABLET BY MOUTH EVERY OTHER DAY. DO NOT EXCEED 1 TABLET IN 24 HOURS.  propranoloL (INDERAL) 10 mg tablet Take 1 Tablet by mouth two (2) times a day. 20 Tablet 1     Current Facility-Administered Medications   Medication Dose Route Frequency Provider Last Rate Last Admin    cloNIDine HCL (CATAPRES) tablet 0.1 mg  0.1 mg Oral NOW Cindy Arvizu NP               The past medical history, past surgical history, and family history were reviewed and updated in the medical record. Lab values/Imaging were reviewed. The medications were reviewed and updated in the medical record. Immunizations were reviewed and updated in the medical record.   All relevant preventative screenings reviewed and updated in the medical record. REVIEW OF SYSTEMS   Review of Systems   Constitutional: Negative for malaise/fatigue. Eyes: Negative for blurred vision. Respiratory: Negative for cough, sputum production, shortness of breath and wheezing. Cardiovascular: Negative for chest pain, palpitations, orthopnea, leg swelling and PND. Gastrointestinal: Negative for abdominal pain, diarrhea, heartburn, nausea and vomiting. Neurological: Negative for dizziness, weakness and headaches. Psychiatric/Behavioral: Negative for depression and suicidal ideas. The patient is nervous/anxious. PHYSICAL EXAM   BP (!) 147/91   Pulse 95   Temp 97.5 °F (36.4 °C) (Temporal)   Resp 18   Ht 5' 11\" (1.803 m)   Wt (!) 448 lb 3.2 oz (203.3 kg)   SpO2 99%   BMI 62.51 kg/m²      Physical Exam  Constitutional:       General: He is not in acute distress. Appearance: Normal appearance. He is obese. He is not ill-appearing. Eyes:      Extraocular Movements: Extraocular movements intact. Pupils: Pupils are equal, round, and reactive to light. Cardiovascular:      Rate and Rhythm: Normal rate and regular rhythm. Pulses: Normal pulses. Heart sounds: Normal heart sounds. No murmur heard. Comments: No LE edema. Pulmonary:      Effort: Pulmonary effort is normal. No respiratory distress. Breath sounds: Normal breath sounds. No wheezing. Abdominal:      General: Abdomen is flat. Bowel sounds are normal. There is no distension. Palpations: Abdomen is soft. There is no mass. Tenderness: There is no abdominal tenderness. Hernia: No hernia is present. Skin:     Capillary Refill: Capillary refill takes less than 2 seconds. Neurological:      Mental Status: He is alert. Psychiatric:         Behavior: Behavior normal.         Thought Content:  Thought content normal.            ASSESSMENT/ PLAN   Below is the assessment and plan developed based on review of pertinent history, physical exam, labs, studies, and medications. 1. Hypogonadism male  -     anastrozole (ARIMIDEX) 1 mg tablet; Take 0.5 mg by mouth every Monday, Wednesday, Friday., Normal, Disp-7 Tablet, R-0  -     testosterone cypionate (DEPOTESTOTERONE CYPIONATE) 200 mg/mL injection; 1 mL by IntraMUSCular route Every Friday. Max Daily Amount: 200 mg. Indications: abnormal function and activity of the testis, Normal, Disp-4 mL, R-0  -     SEX HORMONE BINDING GLOBULIN; Future  -     ESTROGENS, FRACTIONATED; Future  -     TESTOSTERONE, FREE & TOTAL; Future  2. Essential hypertension  -     cloNIDine HCL (CATAPRES) tablet 0.1 mg; 0.1 mg, Oral, NOW, 1 dose, On Mon 3/28/22 at 1000  -     valsartan (DIOVAN) 40 mg tablet; Take 1 Tablet by mouth daily. , Normal, Disp-30 Tablet, R-1  3. Alopecia  -     finasteride (PROPECIA) 1 mg tablet; Take 1 Tablet by mouth daily. , Normal, Disp-30 Tablet, R-0  4. Class 3 severe obesity due to excess calories with serious comorbidity and body mass index (BMI) of 60.0 to 69.9 in adult (HCC)  -     semaglutide, weight loss, (Wegovy) 0.5 mg/0.5 mL pnij; 0.5 mg by SubCUTAneous route every seven (7) days for 30 days. Indications: weight loss management for an obese person, Normal, Disp-4 Each, R-1  -     Insulin Needles, Disposable, (Jeanie Pen Needle) 32 gauge x 5/32\" ndle; Please use one weekly with Wagovy injections. , Normal, Disp-50 Pen Needle, R-0  5. Encounter for weight loss counseling  -     semaglutide, weight loss, (Wegovy) 0.5 mg/0.5 mL pnij; 0.5 mg by SubCUTAneous route every seven (7) days for 30 days. Indications: weight loss management for an obese person, Normal, Disp-4 Each, R-1  -     Insulin Needles, Disposable, (Jeanie Pen Needle) 32 gauge x 5/32\" ndle; Please use one weekly with Wagovy injections. , Normal, Disp-50 Pen Needle, R-0       Discussed with patient the need to establish care earlier with an Endocrinologist. He is agreeable.  Patient is scheduled to see Dr. Leonard Yanez in late May. He will need to get hormones checked once a month until therapeutic levels are achieved. I have increased his Arimidex to three times per week. Patient has agreed to take BP daily and log his numbers in a log. Cardiac precautions discussed. Advised patient to get glucometer with test strips while on Wagovy for prevention of hypoglycemia. Also, to notify the clinic if s/e of the medication cannot be tolerated. Follow-up and Dispositions    · Return in about 4 weeks (around 4/25/2022) for Re-assess effectivenss of Wagovy on weight management. Re-assess hormones with the adjustment of Walter. Disclaimer:  Advised patient to call back or return to office if symptoms worsen/change/persist.  Discussed expected course/resolution/complications of diagnosis in detail with patient. Medication risks/benefits/alternatives discussed with patient. Patient was given an after visit summary which includes diagnoses, current medications, & vitals. Discussed patient instructions and advised to read to all patient instructions regarding care. Patient expressed understanding with the diagnosis and plan.        Flor Agarwal NP  3/28/2022

## 2022-04-05 ENCOUNTER — TELEPHONE (OUTPATIENT)
Dept: PRIMARY CARE CLINIC | Age: 27
End: 2022-04-05

## 2022-04-06 DIAGNOSIS — R73.03 PREDIABETES: Primary | ICD-10-CM

## 2022-04-06 DIAGNOSIS — E66.01 CLASS 3 SEVERE OBESITY DUE TO EXCESS CALORIES WITH SERIOUS COMORBIDITY AND BODY MASS INDEX (BMI) OF 60.0 TO 69.9 IN ADULT (HCC): ICD-10-CM

## 2022-04-06 DIAGNOSIS — Z71.3 ENCOUNTER FOR WEIGHT LOSS COUNSELING: ICD-10-CM

## 2022-04-08 ENCOUNTER — TELEPHONE (OUTPATIENT)
Dept: PRIMARY CARE CLINIC | Age: 27
End: 2022-04-08

## 2022-04-08 DIAGNOSIS — Z71.3 WEIGHT LOSS COUNSELING, ENCOUNTER FOR: ICD-10-CM

## 2022-04-08 DIAGNOSIS — E66.01 CLASS 3 SEVERE OBESITY DUE TO EXCESS CALORIES WITH SERIOUS COMORBIDITY AND BODY MASS INDEX (BMI) OF 60.0 TO 69.9 IN ADULT (HCC): Primary | ICD-10-CM

## 2022-04-08 NOTE — TELEPHONE ENCOUNTER
[facility] from [facility] [address] [facility]    Appeals information sent to the insurance company   [place] message sent to patient informing him of this

## 2022-04-14 ENCOUNTER — TELEPHONE (OUTPATIENT)
Dept: PRIMARY CARE CLINIC | Age: 27
End: 2022-04-14

## 2022-04-14 NOTE — TELEPHONE ENCOUNTER
Returned call to the pharmacy   Informed pharm tech that we do not have control over the appeals process.  The appeal was sent over on 4/11/22 and we are waiting for a reply

## 2022-04-14 NOTE — TELEPHONE ENCOUNTER
[address] calling to ask how long Appeals process generally take for the [address]. She state today is the last day that they will call to sirisha the status. She states if no response to appeal today then it will have to be resubmitted.       230 \[facility] [place] [facility]\""

## 2022-05-18 ENCOUNTER — PATIENT MESSAGE (OUTPATIENT)
Dept: PRIMARY CARE CLINIC | Age: 27
End: 2022-05-18

## 2022-05-18 DIAGNOSIS — E29.1 HYPOGONADISM MALE: ICD-10-CM

## 2022-05-18 RX ORDER — TESTOSTERONE CYPIONATE 200 MG/ML
200 INJECTION INTRAMUSCULAR
Qty: 4 ML | Refills: 0 | Status: SHIPPED | OUTPATIENT
Start: 2022-05-20

## 2022-05-18 NOTE — TELEPHONE ENCOUNTER
Requested Prescriptions     Pending Prescriptions Disp Refills    testosterone cypionate (DEPOTESTOTERONE CYPIONATE) 200 mg/mL injection 4 mL 0     Si mL by IntraMUSCular route Every Friday. Max Daily Amount: 200 mg.  Indications: abnormal function and activity of the testis        Last Visit 3/28/22  Last Refill 22

## 2022-05-19 ENCOUNTER — TELEPHONE (OUTPATIENT)
Dept: PRIMARY CARE CLINIC | Age: 27
End: 2022-05-19

## 2022-05-19 ENCOUNTER — NURSE TRIAGE (OUTPATIENT)
Dept: OTHER | Facility: CLINIC | Age: 27
End: 2022-05-19

## 2022-05-19 NOTE — TELEPHONE ENCOUNTER
Received call from [facility] with Red Flag Complaint. Subjective: Caller states \"Tightness in my upper chest to my neck\"     Current Symptoms: Tightness in upper chest and goes up to his neck with taking breaths. Has allergy symptoms. Breathing is taking more effort even with conversation. Slight SOB at time of call. Onset: 4 days ago; intermittent, unchanged    Associated Symptoms: NA    Pain Severity: 3-4/10; tightness; intermittent, mild    Temperature: denies fever     What has been tried: Claritin-helps slightly    LMP: NA Pregnant: NA    Recommended disposition: Go to [facility]    Care advice provided, patient verbalizes understanding; denies any other questions or concerns; instructed to call back for any new or worsening symptoms. Patient/Caller agrees with recommended disposition; writer provided warm transfer to Luisa Mahajan at [facility] for appointment scheduling    Attention Provider: Thank you for allowing me to participate in the care of your patient. The patient was connected to triage in response to information provided to the ECC. Please do not respond through this encounter as the response is not directed to a shared pool.     Reason for Disposition   MILD difficulty breathing (e.g., minimal/no SOB at rest, SOB with walking, pulse < 100) of new-onset or worse than normal    Protocols used: BREATHING DIFFICULTY-ADULT-OH

## 2022-05-19 NOTE — TELEPHONE ENCOUNTER
Patient transferred back, reports tightness/ feels like there is a weight on his throat and it also feeling tight. Has taken Claritin, having some SOB.  Told the patient I suggest to go to urgent care just to make sure everything is okay

## 2022-06-01 DIAGNOSIS — E29.1 HYPOGONADISM MALE: ICD-10-CM

## 2022-06-02 RX ORDER — ANASTROZOLE 1 MG/1
TABLET ORAL
Qty: 16 TABLET | Refills: 0 | Status: SHIPPED | OUTPATIENT
Start: 2022-06-02

## 2022-07-13 DIAGNOSIS — I10 ESSENTIAL HYPERTENSION: ICD-10-CM

## 2022-07-13 RX ORDER — VALSARTAN 40 MG/1
40 TABLET ORAL DAILY
Qty: 30 TABLET | Refills: 0 | Status: SHIPPED | OUTPATIENT
Start: 2022-07-13 | End: 2022-07-22 | Stop reason: SDUPTHER

## 2022-07-13 NOTE — TELEPHONE ENCOUNTER
PCP: Jemma Kline NP    Last appt: 5/10/2022  Future Appointments   Date Time Provider Monica Maddox   7/18/2022  1:30 PM Lieutenant Jhonny MD RDE [facility] BS AMB       Requested Prescriptions     Pending Prescriptions Disp Refills    valsartan (DIOVAN) 40 mg tablet 30 Tablet 1     Sig: Take 1 Tablet by mouth daily.          Other Comments: last refill 03/38/22

## 2022-07-18 ENCOUNTER — OFFICE VISIT (OUTPATIENT)
Dept: ENDOCRINOLOGY | Age: 27
End: 2022-07-18
Payer: COMMERCIAL

## 2022-07-18 VITALS
BODY MASS INDEX: 44.1 KG/M2 | WEIGHT: 315 LBS | HEART RATE: 111 BPM | SYSTOLIC BLOOD PRESSURE: 132 MMHG | HEIGHT: 71 IN | RESPIRATION RATE: 20 BRPM | OXYGEN SATURATION: 98 % | DIASTOLIC BLOOD PRESSURE: 83 MMHG

## 2022-07-18 DIAGNOSIS — R73.02 IMPAIRED GLUCOSE TOLERANCE: Primary | ICD-10-CM

## 2022-07-18 DIAGNOSIS — E11.9 TYPE 2 DIABETES MELLITUS WITHOUT COMPLICATION, WITHOUT LONG-TERM CURRENT USE OF INSULIN (HCC): ICD-10-CM

## 2022-07-18 PROBLEM — E66.01 CLASS 3 SEVERE OBESITY DUE TO EXCESS CALORIES IN ADULT (HCC): Status: ACTIVE | Noted: 2020-04-10

## 2022-07-18 PROCEDURE — 99204 OFFICE O/P NEW MOD 45 MIN: CPT | Performed by: INTERNAL MEDICINE

## 2022-07-18 PROCEDURE — 3044F HG A1C LEVEL LT 7.0%: CPT | Performed by: INTERNAL MEDICINE

## 2022-07-18 RX ORDER — TIRZEPATIDE 2.5 MG/.5ML
2.5 INJECTION, SOLUTION SUBCUTANEOUS
Qty: 4 EACH | Refills: 0 | Status: SHIPPED | COMMUNITY
Start: 2022-07-18

## 2022-07-18 RX ORDER — METFORMIN HYDROCHLORIDE 750 MG/1
750 TABLET, EXTENDED RELEASE ORAL DAILY
COMMUNITY
Start: 2022-07-02 | End: 2022-07-22

## 2022-07-18 RX ORDER — TIRZEPATIDE 5 MG/.5ML
5 INJECTION, SOLUTION SUBCUTANEOUS
Qty: 4 EACH | Refills: 2 | Status: SHIPPED | OUTPATIENT
Start: 2022-07-18

## 2022-07-18 NOTE — PROGRESS NOTES
Chief Complaint   Patient presents with    New Patient    Hypogonadism     History of Present Illness: Claudeen Cordoba is a 33 y.o. male with a past medical hx significant for HTN, DAVID on CPAP seen in referral from Wilfrid Shepherd NP for discussion related to IFG and hypOgonadism. Reports some degree of emotional eating, taking metformin currently. Lost 120 lbs in teens but did not maintain this weight loss, currently is at maximum weight. Prefers to keep testosterone management through [facility] - has built muscle and was hoping that would help with weight loss but has not thus far. Past Medical History:   Diagnosis Date    Headache     Hypogonadism in male     Sleep apnea      Past Surgical History:   Procedure Laterality Date    HX TYMPANOSTOMY       Current Outpatient Medications   Medication Sig    valsartan (DIOVAN) 40 mg tablet Take 1 Tablet by mouth daily.  anastrozole (ARIMIDEX) 1 mg tablet Take 1/2 tablet  by mouth every Tuesday and Friday.  testosterone cypionate (DEPOTESTOTERONE CYPIONATE) 200 mg/mL injection 1 mL by IntraMUSCular route Every Friday. Max Daily Amount: 200 mg. Indications: abnormal function and activity of the testis    finasteride (PROPECIA) 1 mg tablet Take 1 Tablet by mouth daily.  Insulin Needles, Disposable, (Jeanie Pen Needle) 32 gauge x 5/32\" ndle Please use one weekly with Wagovy injections.  Nurtec ODT 75 mg disintegrating tablet TAKE ONE TABLET BY MOUTH EVERY OTHER DAY. DO NOT EXCEED 1 TABLET IN 24 HOURS.  propranoloL (INDERAL) 10 mg tablet Take 1 Tablet by mouth two (2) times a day. No current facility-administered medications for this visit.      No Known Allergies  Family History   Problem Relation Age of Onset    Immune Disorder Mother     COPD Mother     Alcohol abuse Father     Drug Abuse Brother        Social Hx: does not consume alcohol    Review of Systems:  - See HPI    - Physical Examination:  Visit Vitals   5' 11\" (1.803 m) Wt (!) 457 lb 4.8 oz (207.4 kg)   BMI 63.78 kg/m²   -   -   - - GENERAL: NCAT, Appears well nourished   - EYES: EOMI, non-icteric, no proptosis   - Ear/Nose/Throat: NCAT, no visible inflammation or masses   - CARDIOVASCULAR: no cyanosis, no visible JVD   - RESPIRATORY: respiratory effort normal without any distress or labored breathing   - MUSCULOSKELETAL: Normal ROM of neck and upper extremities observed   - SKIN: No rash on face  - NEUROLOGIC:  No facial asymmetry (Cranial nerve 7 motor function), No gaze palsy   - PSYCHIATRIC: Normal affect, Normal insight and judgement     Data Reviewed:       Assessment/Plan: This is a very pleasant 34 yo gentleman with a past medical hx significant for 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 Initiate GLP1 agonist/GIP to treat elevated BG and prevent further weight gain. Cannot take phentermine/contrave/qsymia due to HTN, anxiety. Reviewed risk of pancreatitis and nausea. #BMI 60  -initiate mounjaro-->5mg after 4 wks  -intolerant to metformin/phentermine/contrave/qsymia due to HTN and anxiety  -reviewed risk of nausea/pancreatitis    #secondary hypOgonadism  -recommend d/c anastrazole  -trial of clomiphene best plan of action    Copy sent to: Jessica Perales NP    RTC Dec    Janett Colon [place] [address] Diabetes [facility] Endocrinology

## 2022-07-22 ENCOUNTER — OFFICE VISIT (OUTPATIENT)
Dept: PRIMARY CARE CLINIC | Age: 27
End: 2022-07-22
Payer: COMMERCIAL

## 2022-07-22 VITALS
RESPIRATION RATE: 18 BRPM | HEART RATE: 93 BPM | HEIGHT: 71 IN | SYSTOLIC BLOOD PRESSURE: 124 MMHG | BODY MASS INDEX: 44.1 KG/M2 | TEMPERATURE: 98 F | OXYGEN SATURATION: 97 % | WEIGHT: 315 LBS | DIASTOLIC BLOOD PRESSURE: 85 MMHG

## 2022-07-22 DIAGNOSIS — Z79.899 MEDICATION MANAGEMENT: ICD-10-CM

## 2022-07-22 DIAGNOSIS — R51.9 SINUS HEADACHE: ICD-10-CM

## 2022-07-22 DIAGNOSIS — I10 ESSENTIAL HYPERTENSION: Primary | ICD-10-CM

## 2022-07-22 DIAGNOSIS — L65.9 ALOPECIA: ICD-10-CM

## 2022-07-22 DIAGNOSIS — Z87.09 HISTORY OF CHRONIC SINUSITIS: ICD-10-CM

## 2022-07-22 DIAGNOSIS — E66.01 CLASS 3 SEVERE OBESITY DUE TO EXCESS CALORIES WITH SERIOUS COMORBIDITY AND BODY MASS INDEX (BMI) OF 60.0 TO 69.9 IN ADULT (HCC): ICD-10-CM

## 2022-07-22 DIAGNOSIS — G47.33 OSA ON CPAP: ICD-10-CM

## 2022-07-22 DIAGNOSIS — E29.1 SECONDARY MALE HYPOGONADISM: ICD-10-CM

## 2022-07-22 DIAGNOSIS — J30.89 ENVIRONMENTAL AND SEASONAL ALLERGIES: ICD-10-CM

## 2022-07-22 DIAGNOSIS — Z99.89 OSA ON CPAP: ICD-10-CM

## 2022-07-22 PROCEDURE — 99214 OFFICE O/P EST MOD 30 MIN: CPT | Performed by: NURSE PRACTITIONER

## 2022-07-22 RX ORDER — VALSARTAN 40 MG/1
40 TABLET ORAL DAILY
Qty: 120 TABLET | Refills: 0 | Status: SHIPPED | OUTPATIENT
Start: 2022-07-22

## 2022-07-22 NOTE — PROGRESS NOTES
Chief Complaint   Patient presents with    Blood Pressure Check    Diabetes       Visit Vitals  /85 (BP 1 Location: Left upper arm)   Pulse (!) 104   Temp 98 °F (36.7 °C)   Resp 18   Ht 5' 11\" (1.803 m)   Wt (!) 455 lb 12.8 oz (206.7 kg)   SpO2 97%   BMI 63.57 kg/m²       1. Have you been to the ER, urgent care clinic since your last visit? Hospitalized since your last visit? Yes EKG, Patient FIrst    2. Have you seen or consulted any other health care providers outside of the [address] [place] [address] since your last visit? Include any pap smears or colon screening.  Yes Endocrinologist

## 2022-07-22 NOTE — PROGRESS NOTES
[facility]   [address], [address] [facility]  P: [phone]  F: [phone]    SUBJECTIVE     HPI:     Wayland Nissen is a 33 y.o. male who is seen in the clinic for   Chief Complaint   Patient presents with    Blood Pressure Check    Diabetes      The patient presents today for management of his co-morbidities. He recently saw Dr. Lu Lopez (Endocrinology) for weight loss management. He was prescribed Mounjaro and began taking it this week. Has not noticed any s/e of the medication. He is scheduled to follow-up with her in December. He also saw a provider at [facility] for treatment of his Secondary Hypogonadism and Alopecia. They are currently prescribing his Arimidex, Testosterone, and Propecia. Denies any s/e of the medication. Recently ran out of Valsartan for his HTN. However, he saved one pill and took it last night. BP at home on the medication has been \"normal\". He would like a referral to Sleep Medicine to order a new CPAP for his DAVID. For the last several years, the patient has seen ENT/Allergy for management of his sinus headaches. They have placed him on several allergies medications along with telling him to use a NettyPot once daily. However, his headaches have been occurring once daily for the last several months. Nurtec \"usually relieves the pain though\". Interested in a referral to ENT for headache prevention strategies.        Patient Active Problem List    Diagnosis    Class 3 severe obesity due to excess calories in adult [facility])        Past Medical History:   Diagnosis Date    Headache     Hypogonadism in male     Sleep apnea      Past Surgical History:   Procedure Laterality Date    HX TYMPANOSTOMY       Social History     Socioeconomic History    Marital status:      Spouse name: Not on file    Number of children: Not on file    Years of education: Not on file    Highest education level: Not on file   Occupational History Not on file   Tobacco Use    Smoking status: Never    Smokeless tobacco: Never   Vaping Use    Vaping Use: Never used   Substance and Sexual Activity    Alcohol use: Not Currently     Comment: occ    Drug use: Never    Sexual activity: Yes     Partners: Female   Other Topics Concern    Not on file   Social History Narrative    Not on file     Social Determinants of Health     Financial Resource Strain: Not on file   Food Insecurity: Not on file   Transportation Needs: Not on file   Physical Activity: Not on file   Stress: Not on file   Social Connections: Not on file   Intimate Partner Violence: Not on file   Housing Stability: Not on file     Family History   Problem Relation Age of Onset    Immune Disorder Mother     COPD Mother     Obesity Mother     Alcohol abuse Father     Drug Abuse Brother      Immunization History   Administered Date(s) Administered    COVID-19, PFIZER PURPLE top, DILUTE for use, (age 15 y+), IM, 30mcg/0.3mL 01/18/2021, 03/31/2021, 04/21/2021, 01/18/2022    Influenza Vaccine (Quad) PF (>6 Mo Flulaval, Fluarix, and >3 Yrs [address], [place] [address]) 03/01/2022      No Known Allergies    No visits with results within 3 Month(s) from this visit. Latest known visit with results is:   Orders Only on 03/11/2022   Component Date Value Ref Range Status    AMPHETAMINES 03/11/2022 Negative  Negative   Final    BARBITURATES 03/11/2022 Negative  Negative   Final    BENZODIAZEPINES 03/11/2022 Negative  Negative   Final    COCAINE 03/11/2022 Negative  Negative   Final    METHADONE 03/11/2022 Negative  Negative   Final    OPIATES 03/11/2022 Negative  Negative   Final    PCP(PHENCYCLIDINE) 03/11/2022 Negative  Negative   Final    THC (TH-CANNABINOL) 03/11/2022 Negative  Negative   Final    Drug screen comment 03/11/2022 (NOTE)   Final    Comment:  This test is a screen for drugs of abuse in a medical setting only   (i.e., they are unconfirmed results and as such must not be used for   non-medical purposes e.g., employment testing, legal testing). Due to   its inherent nature, false positive (FP) and false negative (FN)   results may be obtained. Therefore, if necessary for medical care,   recommend confirmation of positive findings by GC/MS. The cutoff   values (i.e., the level at which this screening test becomes positive   for a given drug group) are:    Amphetamine/Methamphetamine: 300 ng/mL  Barbiturates:                200 ng/mL  Benzodiazepines:             200 ng/mL  Cocaine:                     150 ng/mL  Methadone:                   300 ng/mL  Opiates:                     300 ng/mL   Phencyclidine, PCP:           25 ng/mL  Marijuana, THC:               50 ng/mL    This screening test can identify the presence of the following drugs   when above the cutoff value; see list posted on the intranet. It can   be viewed by claudia                           ecting in sequence the following from the [address] home   page: Gaby; [address] [place] , Resources; [facility], Physician Resources Q to Z; \"UDS (Urine Drug Screen   Automated) List of Detectable Drugs. \"     Or use web address:   [email or web address]  UDS%20List%20of%20Detectable%20Drugs. pdf      Sex Hormone Binding Globulin 03/11/2022 12.5 (A) 16.5 - 55.9 nmol/L Final    Comment: (NOTE)  Performed At: [facility] & [address] [id number]  Nikky Glasgow MD PJ:[id number]      Estrone, Serum 03/11/2022 137 (A) 15 - 65 pg/mL Final    Comment: (NOTE)  **Effective April 4, 2022 Estrone, Serum methodology**   will be changing to Enzyme immunoassay (EIA). The   reference interval will be changing to:                                               Male Range                        Renzo Stage I         5 -  17                        Renzo Stage II       10 -  25                        Renzo Stage III      15 -  25                        Renzo Stage IV       15 -  45 Renzo Stage V        20 -  45                               Adult           0 - 174                                            Female Range                        Renzo Stage I         4 -  29                        Renzo Stage II       10 -  33                        Renzo Stage III      15 -  43                        Renzo Stage IV       16 -  77                        Renzo Stage V        29 - 105                     Adult (Premenopausal)    27 - 231               Menstrual Cycle (1-10 days)    19 - 149               Menstrual Cycle                            (11-20 days)   32 - 176               Menstrual Cycle (21-30 days)   37 - 200                     Adult (Postmenopausal)    0 - 125  Performed At: [facility]  [facility] [address] [id number]  David Morgan MD YZ:[id number]      Estradiol 03/11/2022 75.4 (A) 7.6 - 42.6 pg/mL Final    Comment: (NOTE)  Roche ECLIA methodology  Performed At: [facility]  [facility] [address] [id number]  David Morgan MD ML:[id number]      WBC 03/11/2022 7.7  4.1 - 11.1 K/uL Final    RBC 03/11/2022 5.12  4.10 - 5.70 M/uL Final    HGB 03/11/2022 15.2  12.1 - 17.0 g/dL Final    HCT 03/11/2022 46.9  36.6 - 50.3 % Final    MCV 03/11/2022 91.6  80.0 - 99.0 FL Final    MCH 03/11/2022 29.7  26.0 - 34.0 PG Final    MCHC 03/11/2022 32.4  30.0 - 36.5 g/dL Final    RDW 03/11/2022 12.8  11.5 - 14.5 % Final    PLATELET 77/74/9816 077  150 - 400 K/uL Final    MPV 03/11/2022 10.7  8.9 - 12.9 FL Final    NRBC 03/11/2022 0.0  0  WBC Final    ABSOLUTE NRBC 03/11/2022 0.00  0.00 - 0.01 K/uL Final    Sodium 03/11/2022 138  136 - 145 mmol/L Final    Potassium 03/11/2022 4.8  3.5 - 5.1 mmol/L Final    Chloride 03/11/2022 104  97 - 108 mmol/L Final    CO2 03/11/2022 30  21 - 32 mmol/L Final    Anion gap 03/11/2022 4 (A) 5 - 15 mmol/L Final    Glucose 03/11/2022 100  65 - 100 mg/dL Final    BUN 03/11/2022 20  6 - 20 MG/DL Final Creatinine 03/11/2022 0.95  0.70 - 1.30 MG/DL Final    BUN/Creatinine ratio 03/11/2022 21 (A) 12 - 20   Final    GFR est AA 03/11/2022 >60  >60 ml/min/1.73m2 Final    GFR est non-AA 03/11/2022 >60  >60 ml/min/1.73m2 Final    Comment: Estimated GFR is calculated using the IDMS-traceable Modification of Diet in  Renal Disease (MDRD) Study equation, reported for both  Americans  (GFRAA) and non- Americans (GFRNA), and normalized to 1.73m2 body  surface area. The physician must decide which value applies to the patient. Calcium 03/11/2022 9.4  8.5 - 10.1 MG/DL Final    Bilirubin, total 03/11/2022 0.6  0.2 - 1.0 MG/DL Final    ALT (SGPT) 03/11/2022 78  12 - 78 U/L Final    AST (SGOT) 03/11/2022 32  15 - 37 U/L Final    Alk. phosphatase 03/11/2022 67  45 - 117 U/L Final    Protein, total 03/11/2022 7.3  6.4 - 8.2 g/dL Final    Albumin 03/11/2022 4.2  3.5 - 5.0 g/dL Final    Globulin 03/11/2022 3.1  2.0 - 4.0 g/dL Final    A-G Ratio 03/11/2022 1.4  1.1 - 2.2   Final    Hemoglobin A1c 03/11/2022 5.4  4.0 - 5.6 % Final    Comment: NEW METHOD PLEASE NOTE NEW REFERENCE RANGE  (NOTE)  HbA1C Interpretive Ranges  <5.7              Normal  5.7 - 6.4         Consider Prediabetes  >6.5              Consider Diabetes      Est. average glucose 03/11/2022 108  mg/dL Final    Hep C virus Ab Interp. 03/11/2022 NONREACTIVE  NONREACTIVE   Final    Method used is Siemens Advia [place]    TSH 03/11/2022 1.29  0.36 - 3.74 uIU/mL Final    Comment:   Due to TSH heterogeneity, both structurally and degree of glycosylation,  monoclonal antibodies used in the TSH assay may not accurately quantitate TSH. Therefore, this result should be correlated with clinical findings as well as  with other assessments of thyroid function, e.g., free T4, free T3.       Cholesterol, total 03/11/2022 166  <200 MG/DL Final    Triglyceride 03/11/2022 122  <150 MG/DL Final    Comment: Based on NCEP-ATP III:  Triglycerides <150 mg/dL  is considered normal, 150-199  mg/dL  borderline high,  200-499 mg/dL high and  greater than or equal to 500  mg/dL very high. HDL Cholesterol 03/11/2022 38  MG/DL Final    Comment: Based on NCEP ATP III, HDL Cholesterol <40 mg/dL is considered low and >60  mg/dL is elevated. LDL, calculated 03/11/2022 103.6 (A) 0 - 100 MG/DL Final    Comment: Based on the NCEP-ATP: LDL-C concentrations are considered  optimal <100 mg/dL,  near optimal/above Normal 100-129 mg/dL Borderline High: 130-159, High: 160-189  mg/dL Very High: Greater than or equal to 190 mg/dL      VLDL, calculated 03/11/2022 24.4  MG/DL Final    CHOL/HDL Ratio 03/11/2022 4.4  0.0 - 5.0   Final    Testosterone, total, by LC/MS 03/11/2022 536.8  264.0 - 916.0 ng/dL Final    Comment: (NOTE)  This [facility] LC/MS-MS method is currently certified by the [facility]  Hormone Standardization Program (HoSt). Adult male reference  interval is based on a population of healthy nonobese males  (BMI <30) between 23and 44years old. [address] [place], [place] [address];3891-3583. PMID: 09414576. This test was developed and its performance characteristics  determined by Alejandra Serrato. It has not been cleared or approved  by the Food and Drug Administration. Performed At: [facility]  Lust have it!chantelle Codota [address] [place] [id number]  Berlin Saab MD :[id number]      Free testosterone (Direct) 03/11/2022 18.6  9.3 - 26.5 pg/mL Final    Comment: (NOTE)  Performed At: [facility] [address] [id number]  Berlin Saab MD :[id number]        No image results found. Current Outpatient Medications   Medication Sig Dispense Refill    valsartan (DIOVAN) 40 mg tablet Take 1 Tablet by mouth in the morning. (Needs appointment) 120 Tablet 0    tirzepatide (Mounjaro) 5 mg/0.5 mL pnij 5 mg by SubCUTAneous route every seven (7) days. 4 Each 2    tirzepatide (Mounjaro) 2.5 mg/0.5 mL pnij 2.5 mg by SubCUTAneous route every seven (7) days.  4 Each 0 anastrozole (ARIMIDEX) 1 mg tablet Take 1/2 tablet  by mouth every Tuesday and Friday. 16 Tablet 0    testosterone cypionate (DEPOTESTOTERONE CYPIONATE) 200 mg/mL injection 1 mL by IntraMUSCular route Every Friday. Max Daily Amount: 200 mg. Indications: abnormal function and activity of the testis 4 mL 0    finasteride (PROPECIA) 1 mg tablet Take 1 Tablet by mouth daily. 30 Tablet 0    Insulin Needles, Disposable, (Jeanie Pen Needle) 32 gauge x 5/32\" ndle Please use one weekly with Wagovy injections. 50 Pen Needle 0    Nurtec ODT 75 mg disintegrating tablet TAKE ONE TABLET BY MOUTH EVERY OTHER DAY. DO NOT EXCEED 1 TABLET IN 24 HOURS. The past medical history, past surgical history, and family history were reviewed and updated in the medical record. Lab values/Imaging were reviewed. The medications were reviewed and updated in the medical record. Immunizations were reviewed and updated in the medical record. All relevant preventative screenings reviewed and updated in the medical record. REVIEW OF SYSTEMS   Review of Systems   Constitutional:  Negative for chills, diaphoresis, fever and malaise/fatigue. HENT:  Positive for congestion and sinus pain. Negative for ear pain, hearing loss and tinnitus. Eyes:  Negative for blurred vision. Respiratory:  Negative for cough, shortness of breath and wheezing. Cardiovascular:  Negative for chest pain and palpitations. Neurological:  Positive for headaches. Negative for dizziness, tingling, tremors, sensory change, speech change, focal weakness and weakness. PHYSICAL EXAM   /85 (BP 1 Location: Left upper arm)   Pulse 93   Temp 98 °F (36.7 °C)   Resp 18   Ht 5' 11\" (1.803 m)   Wt (!) 455 lb 12.8 oz (206.7 kg)   SpO2 97%   BMI 63.57 kg/m²      Physical Exam  HENT:      Right Ear: Tympanic membrane, ear canal and external ear normal.      Left Ear: Tympanic membrane, ear canal and external ear normal.      Nose: Congestion present. Mouth/Throat:      Mouth: Mucous membranes are dry. Pharynx: No oropharyngeal exudate or posterior oropharyngeal erythema. Eyes:      Extraocular Movements: Extraocular movements intact. Conjunctiva/sclera: Conjunctivae normal.      Pupils: Pupils are equal, round, and reactive to light. Cardiovascular:      Rate and Rhythm: Normal rate and regular rhythm. Pulses: Normal pulses. Heart sounds: Normal heart sounds. Pulmonary:      Effort: Pulmonary effort is normal. No respiratory distress. Breath sounds: Normal breath sounds. Musculoskeletal:      Cervical back: Neck supple. No tenderness. Lymphadenopathy:      Cervical: No cervical adenopathy. Neurological:      General: No focal deficit present. Cranial Nerves: No cranial nerve deficit. Motor: No weakness. ASSESSMENT/ PLAN   Below is the assessment and plan developed based on review of pertinent history, physical exam, labs, studies, and medications. 1. Essential hypertension  Continue to take BP once daily. -     valsartan (DIOVAN) 40 mg tablet; Take 1 Tablet by mouth in the morning. (Needs appointment), Normal, Disp-120 Tablet, R-0  -     METABOLIC PANEL, COMPREHENSIVE; Future  2. Secondary male hypogonadism  Continue with Arimidex and Testosterone. Managed primarily by [facility]. 3. Class 3 severe obesity due to excess calories with serious comorbidity and body mass index (BMI) of 60.0 to 69.9 in adult [facility])  Managed primarily by Endocrinology. He is aware of lifestyle modifications as well. -     LIPID PANEL; Future  -     HEMOGLOBIN A1C WITH EAG; Future  -     SLEEP MEDICINE REFERRAL  4. DAVID on CPAP  -     SLEEP MEDICINE REFERRAL  5. Environmental and seasonal allergies  -     REFERRAL TO ENT-OTOLARYNGOLOGY  6. Alopecia  Continue with Propecia. Managed primarily by [facility].    7. Sinus headache  Discussed with patient to continue to take prn Nurtec for severe H/A. Also, told him of several medications that he can begin taking to prevent headaches. He would like to discuss this further with ENT. -     CBC WITH AUTOMATED DIFF; Future  -     REFERRAL TO ENT-OTOLARYNGOLOGY  8. History of chronic sinusitis  -     CBC WITH AUTOMATED DIFF; Future  -     REFERRAL TO ENT-OTOLARYNGOLOGY  9. Medication management  -     CBC WITH AUTOMATED DIFF; Future  -     METABOLIC PANEL, COMPREHENSIVE; Future  -     LIPID PANEL; Future  -     HEMOGLOBIN A1C WITH EAG; Future           Follow-up and Dispositions    Return in about 4 months (around 11/22/2022) for Management of co-morbidities . Disclaimer:  Advised patient to call back or return to office if symptoms worsen/change/persist.  Discussed expected course/resolution/complications of diagnosis in detail with patient. Medication risks/benefits/alternatives discussed with patient. Patient was given an after visit summary which includes diagnoses, current medications, & vitals. Discussed patient instructions and advised to read to all patient instructions regarding care. Patient expressed understanding with the diagnosis and plan.        Marcia Toth NP  7/22/2022

## 2022-07-26 ENCOUNTER — TELEPHONE (OUTPATIENT)
Dept: PRIMARY CARE CLINIC | Age: 27
End: 2022-07-26

## 2022-07-26 NOTE — TELEPHONE ENCOUNTER
Inessa with [facility] called requesting rx for Metformin HCL ER 750mg but it looked like it was marked off of his current med list.  Also they were requesting Ozempic as well but it was noted on patient's chart that he should be on Mounjaro now. Please contact Wanda Price with [facility] at FT#[phone] or [phone].

## 2022-07-28 ENCOUNTER — TELEPHONE (OUTPATIENT)
Dept: PRIMARY CARE CLINIC | Age: 27
End: 2022-07-28

## 2022-08-16 ENCOUNTER — TELEPHONE (OUTPATIENT)
Dept: ENDOCRINOLOGY | Age: 27
End: 2022-08-16

## 2022-08-16 NOTE — TELEPHONE ENCOUNTER
8/16/2022    Pt called and left a vm on 8/15 at 4:42 pm stating he just finished his first course of Mourjarno 2.5 mg and would like the 5mg prescription sent to [address] in [place]. Pt can be reached at [phone].     Thanks,   Dung Jimenez

## 2022-08-17 NOTE — TELEPHONE ENCOUNTER
Tried to reach pt again this morning but my call went directly to [employer] and his mailbox was full, therefore, no messages were left.

## 2022-09-02 ENCOUNTER — OFFICE VISIT (OUTPATIENT)
Dept: SLEEP MEDICINE | Age: 27
End: 2022-09-02
Payer: COMMERCIAL

## 2022-09-02 VITALS
WEIGHT: 315 LBS | HEART RATE: 110 BPM | TEMPERATURE: 98.1 F | OXYGEN SATURATION: 98 % | SYSTOLIC BLOOD PRESSURE: 120 MMHG | HEIGHT: 71 IN | BODY MASS INDEX: 44.1 KG/M2 | DIASTOLIC BLOOD PRESSURE: 79 MMHG

## 2022-09-02 DIAGNOSIS — I10 PRIMARY HYPERTENSION: ICD-10-CM

## 2022-09-02 DIAGNOSIS — E66.2 HYPOVENTILATION ASSOCIATED WITH OBESITY SYNDROME (HCC): ICD-10-CM

## 2022-09-02 DIAGNOSIS — G47.33 OSA (OBSTRUCTIVE SLEEP APNEA): Primary | ICD-10-CM

## 2022-09-02 PROCEDURE — 99244 OFF/OP CNSLTJ NEW/EST MOD 40: CPT | Performed by: INTERNAL MEDICINE

## 2022-09-02 NOTE — PATIENT INSTRUCTIONS
[address] [facility] [place]. [place], [address]  Tel.  [phone]  Fax. 100 [facility] 60  [place], [address] [facility]  Tel.  [phone]  Fax. [phone] [address]  Tel.  [phone]  Fax. [phone]     Sleep Apnea: After Your Visit  Your Care Instructions  Sleep apnea occurs when you frequently stop breathing for 10 seconds or longer during sleep. It can be mild to severe, based on the number of times per hour that you stop breathing or have slowed breathing. Blocked or narrowed airways in your nose, mouth, or throat can cause sleep apnea. Your airway can become blocked when your throat muscles and tongue relax during sleep. Sleep apnea is common, occurring in 1 out of 20 individuals. Individuals having any of the following characteristics should be evaluated and treated right away due to high risk and detrimental consequences from untreated sleep apnea:  Obesity  Congestive Heart failure  Atrial Fibrillation  Uncontrolled Hypertension  Type II Diabetes  Night-time Arrhythmias  Stroke  Pulmonary Hypertension  High-risk Driving Populations (pilots, truck drivers, etc.)  Patients Considering Weight-loss Surgery    How do you know you have sleep apnea? You probably have sleep apnea if you answer 'yes' to 3 or more of the following questions:  S - Have you been told that you Snore? T - Are you often Tired during the day? O - Has anyone Observed you stop breathing while sleeping? P- Do you have (or are being treated for) high blood Pressure? B - Are you obese (Body Mass Index > 35)? A - Is your Age 48years old or older? N - Is your Neck size greater than 16 inches? G - Are you male Gender? A sleep physician can prescribe a breathing device that prevents tissues in the throat from blocking your airway. Or your doctor may recommend using a dental device (oral breathing device) to help keep your airway open.  In some cases, surgery may be needed to remove enlarged tissues in the throat. Follow-up care is a key part of your treatment and safety. Be sure to make and go to all appointments, and call your doctor if you are having problems. It's also a good idea to know your test results and keep a list of the medicines you take. How can you care for yourself at home? Lose weight, if needed. It may reduce the number of times you stop breathing or have slowed breathing. Go to bed at the same time every night. Sleep on your side. It may stop mild apnea. If you tend to roll onto your back, sew a pocket in the back of your paDotted Block top. Put a tennis ball into the pocket, and stitch the pocket shut. This will help keep you from sleeping on your back. Avoid alcohol and medicines such as sleeping pills and sedatives before bed. Do not smoke. Smoking can make sleep apnea worse. If you need help quitting, talk to your doctor about stop-smoking programs and medicines. These can increase your chances of quitting for good. Prop up the head of your bed 4 to 6 inches by putting bricks under the legs of the bed. Treat breathing problems, such as a stuffy nose, caused by a cold or allergies. Use a continuous positive airway pressure (CPAP) breathing machine if lifestyle changes do not help your apnea and your doctor recommends it. The machine keeps your airway from closing when you sleep. If CPAP does not help you, ask your doctor whether you should try other breathing machines. A bilevel positive airway pressure machine has two types of air pressureâone for breathing in and one for breathing out. Another device raises or lowers air pressure as needed while you breathe. If your nose feels dry or bleeds when using one of these machines, talk with your doctor about increasing moisture in the air. A humidifier may help.   If your nose is runny or stuffy from using a breathing machine, talk with your doctor about using decongestants or a corticosteroid nasal spray.  When should you call for help? Watch closely for changes in your health, and be sure to contact your doctor if:  You still have sleep apnea even though you have made lifestyle changes. You are thinking of trying a device such as CPAP. You are having problems using a CPAP or similar machine. Where can you learn more? Go to asap54.com. Enter W358 in the search box to learn more about \"Sleep Apnea: After Your Visit. \"   © 1281-6245 Healthwise, Incorporated. Care instructions adapted under license by [facility] (which disclaims liability or warranty for this information). This care instruction is for use with your licensed healthcare professional. If you have questions about a medical condition or this instruction, always ask your healthcare professional. Mat Ady any warranty or liability for your use of this information. PROPER SLEEP HYGIENE    What to avoid  Do not have drinks with caffeine, such as coffee or black tea, for 8 hours before bed. Do not smoke or use other types of tobacco near bedtime. Nicotine is a stimulant and can keep you awake. Avoid drinking alcohol late in the evening, because it can cause you to wake in the middle of the night. Do not eat a big meal close to bedtime. If you are hungry, eat a light snack. Do not drink a lot of water close to bedtime, because the need to urinate may wake you up during the night. Do not read or watch TV in bed. Use the bed only for sleeping and sexual activity. What to try  Go to bed at the same time every night, and wake up at the same time every morning. Do not take naps during the day. Keep your bedroom quiet, dark, and cool. Get regular exercise, but not within 3 to 4 hours of your bedtime. .  Sleep on a comfortable pillow and mattress. If watching the clock makes you anxious, turn it facing away from you so you cannot see the time.   If you worry when you lie down, start a worry book. Well before bedtime, write down your worries, and then set the book and your concerns aside. Try meditation or other relaxation techniques before you go to bed. If you cannot fall asleep, get up and go to another room until you feel sleepy. Do something relaxing. Repeat your bedtime routine before you go to bed again. Make your house quiet and calm about an hour before bedtime. Turn down the lights, turn off the TV, log off the computer, and turn down the volume on music. This can help you relax after a busy day. Drowsy Driving  The [address] [place] [address] Road Traffic Safety Administration cites drowsiness as a causing factor in more than 187,428 police reported crashes annually, resulting in 76,000 injuries and 1,500 deaths. Other surveys suggest 55% of people polled have driven while drowsy in the past year, 23% had fallen asleep but not crashed, 3% crashed, and 2% had and accident due to drowsy driving. Who is at risk? Young Drivers: One study of drowsy driving accidents states that 55% of the drivers were under 25 years. Of those, 75% were male. Shift Workers and Travelers: People who work overnight or travel across time zones frequently are at higher risk of experiencing Circadian Rhythm Disorders. They are trying to work and function when their body is programed to sleep. Sleep Deprived: Lack of sleep has a serious impact on your ability to pay attention or focus on a task. Consistently getting less than the average of 8 hours your body needs creates partial or cumulative sleep deprivation. Untreated Sleep Disorders: Sleep Apnea, Narcolepsy, R.L.S., and other sleep disorders (untreated) prevent a person from getting enough restful sleep. This leads to excessive daytime sleepiness and increases the risk for drowsy driving accidents by up to 7 times. Medications / Alcohol: Even over the counter medications can cause drowsiness.  Medications that impair a drivers attention should have a warning label. Alcohol naturally makes you sleepy and on its own can cause accidents. Combined with excessive drowsiness its effects are amplified. Signs of Drowsy Driving:   * You don't remember driving the last few miles   * You may drift out of your shari   * You are unable to focus and your thoughts wander   * You may yawn more often than normal   * You have difficulty keeping your eyes open / nodding off   * Missing traffic signs, speeding, or tailgating  Prevention-   Good sleep hygiene, lifestyle and behavioral choices have the most impact on drowsy driving. There is no substitute for sleep and the average person requires 8 hours nightly. If you find yourself driving drowsy, stop and sleep. Consider the sleep hygiene tips provided during your visit as well. Medication Refill Policy: Refills for all medications require 1 week advance notice. Please have your pharmacy fax a refill request. We are unable to fax, or call in \"controled substance\" medications and you will need to pick these prescriptions up from our office. [employer] Activation    Thank you for requesting access to [facility] [employer]. Please follow the instructions below to securely access and download your online medical record. [employer] allows you to send messages to your doctor, view your test results, renew your prescriptions, schedule appointments, and more. How Do I Sign Up? In your internet browser, go to [email or web address]. Click on the First Time User? Click Here link in the Sign In box. You will see the New Member Sign Up page. Enter your [employer] Access Code exactly as it appears below. You will not need to use this code after youve completed the sign-up process. If you do not sign up before the expiration date, you must request a new code. [employer] Access Code:  Activation code not generated  Current [employer] Status: Active (This is the date your [employer] access code will )    Enter the last four digits of your Social Security Number (xxxx) and Date of Birth (mm/dd/yyyy) as indicated and click Submit. You will be taken to the next sign-up page. Create a OneCard ID. This will be your OneCard login ID and cannot be changed, so think of one that is secure and easy to remember. Create a OneCard password. You can change your password at any time. Enter your Password Reset Question and Answer. This can be used at a later time if you forget your password. Enter your e-mail address. You will receive e-mail notification when new information is available in [address] Click Sign Up. You can now view and download portions of your medical record. Click the Imagimod link to download a portable copy of your medical information. Additional Information    If you have questions, please call [phone]. Remember, OneCard is NOT to be used for urgent needs. For medical emergencies, dial 911.

## 2022-09-02 NOTE — PROGRESS NOTES
[address] [address] [facility] [address] [place]. [place], [address]  Tel.  [phone]  Fax. 100 [facility] 60  [place], [address] [facility]  Tel.  [phone]  Fax. [phone] [address]  Tel.  [phone]  Fax. [phone]       Юлия Isbell is a 33y.o. year old male referred by Mr. Keke Flores NP  for evaluation of a sleep disorder. ASSESSMENT/PLAN:      ICD-10-CM ICD-9-CM    1. DAVID (obstructive sleep apnea)  G47.33 327.23 POLYSOMNOGRAPHY 1 NIGHT      2. Hypoventilation associated with obesity syndrome (HCC)  E66.2 278.03 POLYSOMNOGRAPHY 1 NIGHT      3. Primary hypertension  I10 401.9       4. Adult BMI 50.0-59.9 kg/sq m [facility])  Z68.43 V85.43           Patient has a history and examination consistent with the diagnosis of sleep apnea. Follow-up and Dispositions    Return for telephone follow-up after testing is completed. * The patient currently has a High Risk for having sleep apnea. STOP-BANG score 7.    * Sleep testing was ordered for initial evaluation. Orders Placed This Encounter    POLYSOMNOGRAPHY 1 NIGHT     Standing Status:   Future     Standing Expiration Date:   12/2/2022     Scheduling Instructions:      Perform ETCO2 monitoring during Polysomnography     Order Specific Question:   Reason for Exam     Answer:   DAVID / OHS         * He was provided information on sleep apnea including corresponding risk factors and the importance of proper treatment. * Treatment options were reviewed in detail. he would like to proceed with PAP therapy. Patient will be seen in follow-up in 6-8 weeks after PAP setup to gauge treatment response and adherence to therapy. * The patient was counseled regarding proper sleep hygiene, with emphasis on ensuring sufficient total sleep time; safe driving and the benefits of exercise and weight loss. * All of his questions were addressed.     2.  Hypoventilation associated with obesity syndrome (HCC) - Elevated bicarbonate levels on metabolic screen in the absence of any significant cardio-pulmonary problem is suggestive of hypoventilation associated with obesity. EtCO2 levels will be monitored during attended polysomnography, this cannot be done on home sleep apnea testing. 3. Hypertension -  continue on current regimen, he will continue to monitor his BP and follow up with his primary care provider for reevaluation/adjustment of medications if warranted. I have reviewed the relationship between hypertension as it relates to sleep-disordered breathing. 4. Recommended a dedicated weight loss program through appropriate diet and exercise regimen as significant weight reduction has been shown to reduce severity of obstructive sleep apnea. SUBJECTIVE/OBJECTIVE:    Erin Quintero is an 33 y.o. male referred for evaluation for a sleep disorder. He complains of snoring associated with periods of not breathing, awakening in the middle of the night because of palpitations and no specific reason along with excessive daytime sleepiness. Symptoms began several years ago, he was diagnosed with DAVID in another state and place on APAP Therapy since that time. He has gained weight since initial diagnosis and reports of lower chest discomfort on waking. He usually can fall asleep in 30 minutes. Family or house members note snoring, periods of not breathing. He denies of symptoms indicative of cataplexy, sleep paralysis or sleep related hallucinations. He denies of a history of unusual movements occurring during sleep. Erin Quintero (ALLEY) does not wake up frequently at night. He (P) is not bothered by waking up too early and left unable to get back to sleep. He actually sleeps about (P) 7 hours at night and wakes up about (P) 0 times during the night. He (P) does not work shifts: Jeff Smith indicates he (P) does not get too little sleep at night. His bedtime is (P) 0200.  He awakens at (P) 1000. He (P) does not take naps. He has the following observed behaviors: (P) Not applicable;  . Other remarks:      CO2 21 - 32 mmol/L 30      The patient has undergone diagnostic testing for the current problems. An attempt has been made to get prior records but without success. Review of Systems:  Constitutional:  No significant weight loss or weight gain  Eyes:  No blurred vision  CVS:  No significant chest pain  Pulm:  No significant shortness of breath  GI:  No significant nausea or vomiting  :  No significant nocturia  Musculoskeletal:  No significant joint pain at night  Skin:  No significant rashes  Neuro:  No significant dizziness   Psych:  No active mood issues    Sleep Review of Systems: notable for Positive difficulty falling asleep; Negative awakenings at night; rare perceived regular dreaming; Negative nightmares; Negative  early morning headaches; Negative  memory problems; Negative  concentration issues; Positive caffeine;  Negative alcohol;   Negative history of any automobile or occupational accidents due to daytime drowsiness. [place] Sleepiness Score: (P) 2   and Modified F.O.S.Q. Score Total / 2: (P) 20    Visit Vitals  /79   Pulse (!) 110   Temp 98.1 °F (36.7 °C)   Ht 5' 11\" (1.803 m)   Wt (!) 430 lb (195 kg)   SpO2 98%   BMI 59.97 kg/m²           General:   Alert, oriented, not in acute distress   Eyes:  Anicteric Sclerae; intact EOM's   Nose:  No obvious nasal septum deviation    Oropharynx:   Mallampati score 4, thick tongue base, uvula not seen due to low-lying soft palate, narrow tonsilo-pharyngeal pilars, tongue scalloped   Neck:   midline trachea,  no JVD   Chest/Lungs:  symmetrical lung expansion ,clear lung fields on auscultation    CVS:  Normal rate, regular rhythm    Extremities:  No obvious rashes, absent edema    Neuro:  No focal deficits;  No obvious tremor    Psych:  Normal eye contact; normal  affect, normal countenance          FRANK TAVERAS, MD, FAASM  Diplomate American Board of Sleep Medicine  Diplomate in Sleep Medicine - ABP    Electronically signed.  09/02/22

## 2022-10-07 ENCOUNTER — TELEPHONE (OUTPATIENT)
Dept: SLEEP MEDICINE | Age: 27
End: 2022-10-07

## 2022-10-07 DIAGNOSIS — E66.2 HYPOVENTILATION ASSOCIATED WITH OBESITY SYNDROME (HCC): ICD-10-CM

## 2022-10-07 DIAGNOSIS — G47.33 OSA (OBSTRUCTIVE SLEEP APNEA): Primary | ICD-10-CM

## 2022-10-07 NOTE — TELEPHONE ENCOUNTER
Contacted Dr. Morro Muse [phone] and reviewed patient record and discussed need to monitor EtCO2 on attended polysomnogram which was approved.

## 2022-10-12 ENCOUNTER — HOSPITAL ENCOUNTER (OUTPATIENT)
Dept: SLEEP MEDICINE | Age: 27
Discharge: HOME | End: 2022-10-12
Payer: COMMERCIAL

## 2022-10-12 VITALS
OXYGEN SATURATION: 97 % | DIASTOLIC BLOOD PRESSURE: 86 MMHG | HEIGHT: 71 IN | HEART RATE: 105 BPM | TEMPERATURE: 96.8 F | WEIGHT: 315 LBS | BODY MASS INDEX: 44.1 KG/M2 | SYSTOLIC BLOOD PRESSURE: 143 MMHG

## 2022-10-12 DIAGNOSIS — E66.2 HYPOVENTILATION ASSOCIATED WITH OBESITY SYNDROME (HCC): ICD-10-CM

## 2022-10-12 DIAGNOSIS — G47.33 OSA (OBSTRUCTIVE SLEEP APNEA): ICD-10-CM

## 2022-10-12 PROCEDURE — 95810 POLYSOM 6/> YRS 4/> PARAM: CPT | Performed by: INTERNAL MEDICINE

## 2022-10-13 ENCOUNTER — TELEPHONE (OUTPATIENT)
Dept: SLEEP MEDICINE | Age: 27
End: 2022-10-13

## 2022-10-13 DIAGNOSIS — G47.33 OSA (OBSTRUCTIVE SLEEP APNEA): Primary | ICD-10-CM

## 2022-10-13 NOTE — PROGRESS NOTES
[address] [facility]. [place], [facility]  Tel.  [phone]  Fax. 100 [facility] 60  [place], [address] [facility]  Tel.  [phone]  Fax. [phone] [address]  Tel.  [phone]  Fax. [phone]     Sleep Study Technical Notes        PRE-Test:  Kishor Benjamin (: 1995) is here for a PSG w/ ETC02 study. He is a 33year old male with history of snoring, witnessed apnea, restless sleep, excessive daytime sleepiness, known DAVID, and current APAP user. He was diagnosed with DAVID years ago (no report in chart). His last download done 22 shows APAP 6.0cm to 9.0cm, usage 7.11 hours, Overall AHI 0.1, Average pressure 9.0cm. Patient was greeted and screening questions asked. The patient was taken directly to his/her room. Vitals:  Temperature (96.8)   BP (143/86)   SaO2 (97%)   Weight per patient (420 lbs)  Procedure explained to the patient and questions were answered. The patient expressed understanding of the procedure. Electrodes were applied without incident. The patient was placed in bed and the study was started.   PAP mask acclimation:  NA  Sleep aid taken:  NA    Acquisition Notes:  Lights off: 9:41 PM    Respiratory events: RERAS, HYPOPNEAS, CENTRAL APNEAS AND OBSTRUCTIVE APNEAS   ECG:  IRREGULAR RHYTHM NOTED  Snoring:  MILD/MODERATE TO LOUD/VERY LOUD   PAP titration:  NA  Desensitization Mask(s) Used: NA  Other comments: OVERALL AHI AT END OF THE STUDY = 5.0                                     AVERAGE SA02 96% AND LOW SA02 89%                                     LMI = 14.0                                     AVERAGE ETC02 = 41.0  Patient had caregiver in attendance:  NO  Patient watched TV or on phone after lights out:  YES - 6 MINUTES   Patient slept with positional therapy:  NO  Patient slept with head of bed elevated:  NO  Patient wore an oral appliance:  NO  Sleep stages: 1, 2, AND 3  Sleep positions: SUPINE AND ON HIS RIGHT SIDE  Patient to bathroom 0 times  Pediatric Patient:  NA  Parent accompanied patient: NA  Parent slept in bed with patient: NA    POST Test:  Patient was awakened. Electrodes were removed. The patient was discharged after answering the Post Questionnaire. Patient stated that he was alert and ok to drive. Equipment and room cleaned per infection control policy.

## 2022-10-21 ENCOUNTER — PATIENT MESSAGE (OUTPATIENT)
Dept: SLEEP MEDICINE | Age: 27
End: 2022-10-21

## 2022-10-21 NOTE — TELEPHONE ENCOUNTER
Ne Buckner underwent Sleep Testing which was indicative of an average AHI of 7.0 per hour with an SpO2 yasmeen of 89% and SpO2 of < 88% being 0.00 minutes. An APAP prescription has been written and patient will be contacted by office staff regarding follow-up  in 2-3 months after initiation of therapy. Encounter Diagnosis   Name Primary? DAVID (obstructive sleep apnea) Yes       Orders Placed This Encounter    AMB SUPPLY ORDER     Diagnosis: Obstructive Sleep Apnea ICD-10 Code (G47.33)    Positive Airway Pressure Therapy: Duration of need: 99 months. APAP Device with Heated Humidifer [id number] / [id number]. Minimum Pressure: 06 cmH2O, Maximum Pressure: 20 cmH2O.     Nasal Pillows Combo Mask (Replace) 2 per month.  Nasal Pillows (Replace) 2 per month.  Full Face Mask 1 every 3 months.  Full Face Mask Cushion 1 per month.  Nasal Cushion (Replace) 2 per month.  Nasal Interface Mask 1 every 3 months.  Headgear 1 every 6 months.  Chinstrap 1 every 6 months.  Tubing 1 every 3 months.  Filter(s) Disposable 2 per month.  Filter(s) Non-Disposable 1 every 6 months. .   [address] [facility] [address] for Humidifier (Replace) 1 every 6 months. Perform Mask Fitting per patient preference and comfort - replace as above. Berlin Velázquez MD, FAA; NPI: [id number]  Electronically signed. 10/20/22

## 2022-10-28 ENCOUNTER — DOCUMENTATION ONLY (OUTPATIENT)
Dept: SLEEP MEDICINE | Age: 27
End: 2022-10-28

## 2022-10-31 NOTE — TELEPHONE ENCOUNTER
Patient informed of Faxed pap order to [place]  Need to Schedule 1st adherence visit in 60 to 90 days

## 2023-01-10 RX ORDER — TIRZEPATIDE 5 MG/.5ML
INJECTION, SOLUTION SUBCUTANEOUS
Qty: 4 ML | Refills: 0 | Status: SHIPPED | OUTPATIENT
Start: 2023-01-10

## 2023-02-21 RX ORDER — TIRZEPATIDE 5 MG/.5ML
INJECTION, SOLUTION SUBCUTANEOUS
Refills: 0 | OUTPATIENT
Start: 2023-02-21

## 2023-09-06 ENCOUNTER — TELEPHONE (OUTPATIENT)
Age: 28
End: 2023-09-06

## 2023-09-06 NOTE — TELEPHONE ENCOUNTER
Zulma Lang @ [facility] called stated patient needed an order for pap device. Informed Zulma Lang, patient needs to schedule a follow up visit, hasn't been seen in a year.   Phone # [phone]

## 2023-09-07 NOTE — TELEPHONE ENCOUNTER
Balbina Jang @ [facility] sent an email stated Patient was scheduled   12/09/2022 and  03/27/2023 and did not show up for appointment. Will need new  notes and order to start process all  over.

## 2024-05-19 ENCOUNTER — OFFICE VISIT (OUTPATIENT)
Age: 29
End: 2024-05-19

## 2024-05-19 VITALS
DIASTOLIC BLOOD PRESSURE: 88 MMHG | TEMPERATURE: 98.4 F | OXYGEN SATURATION: 95 % | BODY MASS INDEX: 44.1 KG/M2 | HEIGHT: 71 IN | WEIGHT: 315 LBS | SYSTOLIC BLOOD PRESSURE: 149 MMHG | HEART RATE: 114 BPM | RESPIRATION RATE: 18 BRPM

## 2024-05-19 DIAGNOSIS — H61.23 BILATERAL IMPACTED CERUMEN: Primary | ICD-10-CM

## 2024-05-19 RX ORDER — TIRZEPATIDE 5 MG/.5ML
5 INJECTION, SOLUTION SUBCUTANEOUS WEEKLY
COMMUNITY

## 2024-05-19 RX ORDER — VALSARTAN 80 MG/1
80 TABLET ORAL DAILY
COMMUNITY

## 2024-05-19 RX ORDER — LISDEXAMFETAMINE DIMESYLATE 40 MG/1
40 CAPSULE ORAL DAILY
COMMUNITY